# Patient Record
Sex: MALE | Race: WHITE | NOT HISPANIC OR LATINO | URBAN - METROPOLITAN AREA
[De-identification: names, ages, dates, MRNs, and addresses within clinical notes are randomized per-mention and may not be internally consistent; named-entity substitution may affect disease eponyms.]

---

## 2017-08-23 ENCOUNTER — OUTPATIENT (OUTPATIENT)
Dept: OUTPATIENT SERVICES | Facility: HOSPITAL | Age: 77
LOS: 1 days | End: 2017-08-23
Payer: MEDICARE

## 2017-08-23 ENCOUNTER — APPOINTMENT (OUTPATIENT)
Dept: RADIOLOGY | Facility: CLINIC | Age: 77
End: 2017-08-23
Payer: MEDICARE

## 2017-08-23 DIAGNOSIS — Z00.8 ENCOUNTER FOR OTHER GENERAL EXAMINATION: ICD-10-CM

## 2017-08-23 PROCEDURE — 73562 X-RAY EXAM OF KNEE 3: CPT

## 2017-08-23 PROCEDURE — 73562 X-RAY EXAM OF KNEE 3: CPT | Mod: 26,RT

## 2019-01-01 ENCOUNTER — TRANSCRIPTION ENCOUNTER (OUTPATIENT)
Age: 79
End: 2019-01-01

## 2019-01-01 ENCOUNTER — APPOINTMENT (OUTPATIENT)
Dept: SURGERY | Facility: CLINIC | Age: 79
End: 2019-01-01

## 2019-01-01 ENCOUNTER — INPATIENT (INPATIENT)
Facility: HOSPITAL | Age: 79
LOS: 2 days | Discharge: HOME | DRG: 394 | End: 2019-12-30
Attending: SURGERY | Admitting: SURGERY
Payer: COMMERCIAL

## 2019-01-01 VITALS
DIASTOLIC BLOOD PRESSURE: 69 MMHG | HEART RATE: 71 BPM | RESPIRATION RATE: 18 BRPM | TEMPERATURE: 99 F | SYSTOLIC BLOOD PRESSURE: 115 MMHG | OXYGEN SATURATION: 96 %

## 2019-01-01 VITALS
RESPIRATION RATE: 18 BRPM | DIASTOLIC BLOOD PRESSURE: 74 MMHG | HEART RATE: 74 BPM | OXYGEN SATURATION: 96 % | TEMPERATURE: 98 F | SYSTOLIC BLOOD PRESSURE: 129 MMHG

## 2019-01-01 VITALS — HEIGHT: 69 IN | WEIGHT: 229.94 LBS

## 2019-01-01 DIAGNOSIS — K56.609 UNSPECIFIED INTESTINAL OBSTRUCTION, UNSPECIFIED AS TO PARTIAL VERSUS COMPLETE OBSTRUCTION: ICD-10-CM

## 2019-01-01 LAB
ALBUMIN SERPL ELPH-MCNC: 3.9 G/DL — SIGNIFICANT CHANGE UP (ref 3.3–5)
ALP SERPL-CCNC: 177 U/L — HIGH (ref 40–120)
ALT FLD-CCNC: 23 U/L — SIGNIFICANT CHANGE UP (ref 10–45)
ANION GAP SERPL CALC-SCNC: 10 MMOL/L — SIGNIFICANT CHANGE UP (ref 5–17)
ANION GAP SERPL CALC-SCNC: 11 MMOL/L — SIGNIFICANT CHANGE UP (ref 5–17)
ANION GAP SERPL CALC-SCNC: 11 MMOL/L — SIGNIFICANT CHANGE UP (ref 5–17)
ANION GAP SERPL CALC-SCNC: 13 MMOL/L — SIGNIFICANT CHANGE UP (ref 5–17)
ANION GAP SERPL CALC-SCNC: 7 MMOL/L — SIGNIFICANT CHANGE UP (ref 5–17)
ANION GAP SERPL CALC-SCNC: 8 MMOL/L — SIGNIFICANT CHANGE UP (ref 5–17)
ANISOCYTOSIS BLD QL: SIGNIFICANT CHANGE UP
APTT BLD: 176 SEC — CRITICAL HIGH (ref 27.5–36.3)
AST SERPL-CCNC: 27 U/L — SIGNIFICANT CHANGE UP (ref 10–40)
BASE EXCESS BLDV CALC-SCNC: 1.1 MMOL/L — SIGNIFICANT CHANGE UP (ref -2–2)
BASE EXCESS BLDV CALC-SCNC: 3.1 MMOL/L — HIGH (ref -2–2)
BASOPHILS # BLD AUTO: 0 K/UL — SIGNIFICANT CHANGE UP (ref 0–0.2)
BASOPHILS NFR BLD AUTO: 0 % — SIGNIFICANT CHANGE UP (ref 0–2)
BILIRUB SERPL-MCNC: 0.4 MG/DL — SIGNIFICANT CHANGE UP (ref 0.2–1.2)
BLD GP AB SCN SERPL QL: NEGATIVE — SIGNIFICANT CHANGE UP
BUN SERPL-MCNC: 15 MG/DL — SIGNIFICANT CHANGE UP (ref 7–23)
BUN SERPL-MCNC: 16 MG/DL — SIGNIFICANT CHANGE UP (ref 7–23)
BUN SERPL-MCNC: 16 MG/DL — SIGNIFICANT CHANGE UP (ref 7–23)
BUN SERPL-MCNC: 22 MG/DL — SIGNIFICANT CHANGE UP (ref 7–23)
BUN SERPL-MCNC: 26 MG/DL — HIGH (ref 7–23)
BUN SERPL-MCNC: 32 MG/DL — HIGH (ref 7–23)
BURR CELLS BLD QL SMEAR: PRESENT — SIGNIFICANT CHANGE UP
CA-I SERPL-SCNC: 1.33 MMOL/L — HIGH (ref 1.12–1.3)
CA-I SERPL-SCNC: 1.34 MMOL/L — HIGH (ref 1.12–1.3)
CALCIUM SERPL-MCNC: 10 MG/DL — SIGNIFICANT CHANGE UP (ref 8.4–10.5)
CALCIUM SERPL-MCNC: 10.6 MG/DL — HIGH (ref 8.4–10.5)
CALCIUM SERPL-MCNC: 8.7 MG/DL — SIGNIFICANT CHANGE UP (ref 8.4–10.5)
CALCIUM SERPL-MCNC: 9 MG/DL — SIGNIFICANT CHANGE UP (ref 8.4–10.5)
CALCIUM SERPL-MCNC: 9 MG/DL — SIGNIFICANT CHANGE UP (ref 8.4–10.5)
CALCIUM SERPL-MCNC: 9.1 MG/DL — SIGNIFICANT CHANGE UP (ref 8.4–10.5)
CHLORIDE BLDV-SCNC: 107 MMOL/L — SIGNIFICANT CHANGE UP (ref 96–108)
CHLORIDE BLDV-SCNC: 109 MMOL/L — HIGH (ref 96–108)
CHLORIDE SERPL-SCNC: 103 MMOL/L — SIGNIFICANT CHANGE UP (ref 96–108)
CHLORIDE SERPL-SCNC: 103 MMOL/L — SIGNIFICANT CHANGE UP (ref 96–108)
CHLORIDE SERPL-SCNC: 106 MMOL/L — SIGNIFICANT CHANGE UP (ref 96–108)
CHLORIDE SERPL-SCNC: 107 MMOL/L — SIGNIFICANT CHANGE UP (ref 96–108)
CHLORIDE SERPL-SCNC: 108 MMOL/L — SIGNIFICANT CHANGE UP (ref 96–108)
CHLORIDE SERPL-SCNC: 109 MMOL/L — HIGH (ref 96–108)
CO2 BLDV-SCNC: 27 MMOL/L — SIGNIFICANT CHANGE UP (ref 22–30)
CO2 BLDV-SCNC: 29 MMOL/L — SIGNIFICANT CHANGE UP (ref 22–30)
CO2 SERPL-SCNC: 23 MMOL/L — SIGNIFICANT CHANGE UP (ref 22–31)
CO2 SERPL-SCNC: 24 MMOL/L — SIGNIFICANT CHANGE UP (ref 22–31)
CO2 SERPL-SCNC: 24 MMOL/L — SIGNIFICANT CHANGE UP (ref 22–31)
CO2 SERPL-SCNC: 25 MMOL/L — SIGNIFICANT CHANGE UP (ref 22–31)
CO2 SERPL-SCNC: 25 MMOL/L — SIGNIFICANT CHANGE UP (ref 22–31)
CO2 SERPL-SCNC: 26 MMOL/L — SIGNIFICANT CHANGE UP (ref 22–31)
CREAT SERPL-MCNC: 0.78 MG/DL — SIGNIFICANT CHANGE UP (ref 0.5–1.3)
CREAT SERPL-MCNC: 0.81 MG/DL — SIGNIFICANT CHANGE UP (ref 0.5–1.3)
CREAT SERPL-MCNC: 0.84 MG/DL — SIGNIFICANT CHANGE UP (ref 0.5–1.3)
CREAT SERPL-MCNC: 0.88 MG/DL — SIGNIFICANT CHANGE UP (ref 0.5–1.3)
CREAT SERPL-MCNC: 0.91 MG/DL — SIGNIFICANT CHANGE UP (ref 0.5–1.3)
CREAT SERPL-MCNC: 0.94 MG/DL — SIGNIFICANT CHANGE UP (ref 0.5–1.3)
CULTURE RESULTS: SIGNIFICANT CHANGE UP
DACRYOCYTES BLD QL SMEAR: SLIGHT — SIGNIFICANT CHANGE UP
ELLIPTOCYTES BLD QL SMEAR: SIGNIFICANT CHANGE UP
EOSINOPHIL # BLD AUTO: 0 K/UL — SIGNIFICANT CHANGE UP (ref 0–0.5)
EOSINOPHIL NFR BLD AUTO: 0 % — SIGNIFICANT CHANGE UP (ref 0–6)
GAS PNL BLDV: 142 MMOL/L — SIGNIFICANT CHANGE UP (ref 135–145)
GAS PNL BLDV: 143 MMOL/L — SIGNIFICANT CHANGE UP (ref 135–145)
GAS PNL BLDV: SIGNIFICANT CHANGE UP
GLUCOSE BLDV-MCNC: 127 MG/DL — HIGH (ref 70–99)
GLUCOSE BLDV-MCNC: 139 MG/DL — HIGH (ref 70–99)
GLUCOSE SERPL-MCNC: 118 MG/DL — HIGH (ref 70–99)
GLUCOSE SERPL-MCNC: 146 MG/DL — HIGH (ref 70–99)
GLUCOSE SERPL-MCNC: 81 MG/DL — SIGNIFICANT CHANGE UP (ref 70–99)
GLUCOSE SERPL-MCNC: 90 MG/DL — SIGNIFICANT CHANGE UP (ref 70–99)
GLUCOSE SERPL-MCNC: 95 MG/DL — SIGNIFICANT CHANGE UP (ref 70–99)
GLUCOSE SERPL-MCNC: 98 MG/DL — SIGNIFICANT CHANGE UP (ref 70–99)
HCO3 BLDV-SCNC: 26 MMOL/L — SIGNIFICANT CHANGE UP (ref 21–29)
HCO3 BLDV-SCNC: 28 MMOL/L — SIGNIFICANT CHANGE UP (ref 21–29)
HCT VFR BLD CALC: 25.4 % — LOW (ref 39–50)
HCT VFR BLD CALC: 26.2 % — LOW (ref 39–50)
HCT VFR BLD CALC: 29 % — LOW (ref 39–50)
HCT VFR BLD CALC: 29.1 % — LOW (ref 39–50)
HCT VFR BLD CALC: 29.4 % — LOW (ref 39–50)
HCT VFR BLD CALC: 29.9 % — LOW (ref 39–50)
HCT VFR BLD CALC: 31.7 % — LOW (ref 39–50)
HCT VFR BLD CALC: 35.6 % — LOW (ref 39–50)
HCT VFR BLD CALC: SIGNIFICANT CHANGE UP (ref 39–50)
HCT VFR BLDA CALC: 26 % — LOW (ref 39–50)
HCT VFR BLDA CALC: 30 % — LOW (ref 39–50)
HGB BLD CALC-MCNC: 8.5 G/DL — LOW (ref 13–17)
HGB BLD CALC-MCNC: 9.9 G/DL — LOW (ref 13–17)
HGB BLD-MCNC: 7 G/DL — CRITICAL LOW (ref 13–17)
HGB BLD-MCNC: 7.1 G/DL — LOW (ref 13–17)
HGB BLD-MCNC: 7.4 G/DL — LOW (ref 13–17)
HGB BLD-MCNC: 8 G/DL — LOW (ref 13–17)
HGB BLD-MCNC: 8 G/DL — LOW (ref 13–17)
HGB BLD-MCNC: 8.1 G/DL — LOW (ref 13–17)
HGB BLD-MCNC: 8.3 G/DL — LOW (ref 13–17)
HGB BLD-MCNC: 8.8 G/DL — LOW (ref 13–17)
HGB BLD-MCNC: 9.5 G/DL — LOW (ref 13–17)
HYPOCHROMIA BLD QL: SIGNIFICANT CHANGE UP
INR BLD: 1.25 RATIO — HIGH (ref 0.88–1.16)
LACTATE BLDV-MCNC: 1.6 MMOL/L — SIGNIFICANT CHANGE UP (ref 0.7–2)
LACTATE BLDV-MCNC: 2.4 MMOL/L — HIGH (ref 0.7–2)
LACTATE SERPL-SCNC: 1.4 MMOL/L — SIGNIFICANT CHANGE UP (ref 0.7–2)
LDH SERPL L TO P-CCNC: 134 U/L — SIGNIFICANT CHANGE UP (ref 50–242)
LYMPHOCYTES # BLD AUTO: 0.87 K/UL — LOW (ref 1–3.3)
LYMPHOCYTES # BLD AUTO: 5.2 % — LOW (ref 13–44)
MAGNESIUM SERPL-MCNC: 1.8 MG/DL — SIGNIFICANT CHANGE UP (ref 1.6–2.6)
MAGNESIUM SERPL-MCNC: 1.9 MG/DL — SIGNIFICANT CHANGE UP (ref 1.6–2.6)
MAGNESIUM SERPL-MCNC: 1.9 MG/DL — SIGNIFICANT CHANGE UP (ref 1.6–2.6)
MAGNESIUM SERPL-MCNC: 2.2 MG/DL — SIGNIFICANT CHANGE UP (ref 1.6–2.6)
MAGNESIUM SERPL-MCNC: 2.2 MG/DL — SIGNIFICANT CHANGE UP (ref 1.6–2.6)
MANUAL SMEAR VERIFICATION: SIGNIFICANT CHANGE UP
MCHC RBC-ENTMCNC: 19.4 PG — LOW (ref 27–34)
MCHC RBC-ENTMCNC: 19.5 PG — LOW (ref 27–34)
MCHC RBC-ENTMCNC: 19.5 PG — LOW (ref 27–34)
MCHC RBC-ENTMCNC: 19.7 PG — LOW (ref 27–34)
MCHC RBC-ENTMCNC: 19.8 PG — LOW (ref 27–34)
MCHC RBC-ENTMCNC: 21.2 PG — LOW (ref 27–34)
MCHC RBC-ENTMCNC: 26.7 GM/DL — LOW (ref 32–36)
MCHC RBC-ENTMCNC: 27.1 GM/DL — LOW (ref 32–36)
MCHC RBC-ENTMCNC: 27.1 GM/DL — LOW (ref 32–36)
MCHC RBC-ENTMCNC: 27.2 GM/DL — LOW (ref 32–36)
MCHC RBC-ENTMCNC: 27.6 GM/DL — LOW (ref 32–36)
MCHC RBC-ENTMCNC: 27.6 GM/DL — LOW (ref 32–36)
MCHC RBC-ENTMCNC: 27.8 GM/DL — LOW (ref 32–36)
MCHC RBC-ENTMCNC: 28.5 GM/DL — LOW (ref 32–36)
MCHC RBC-ENTMCNC: SIGNIFICANT CHANGE UP (ref 27–34)
MCHC RBC-ENTMCNC: SIGNIFICANT CHANGE UP (ref 32–36)
MCV RBC AUTO: 71.2 FL — LOW (ref 80–100)
MCV RBC AUTO: 71.8 FL — LOW (ref 80–100)
MCV RBC AUTO: 71.8 FL — LOW (ref 80–100)
MCV RBC AUTO: 71.9 FL — LOW (ref 80–100)
MCV RBC AUTO: 72 FL — LOW (ref 80–100)
MCV RBC AUTO: 72.2 FL — LOW (ref 80–100)
MCV RBC AUTO: 72.7 FL — LOW (ref 80–100)
MCV RBC AUTO: 74.4 FL — LOW (ref 80–100)
MCV RBC AUTO: SIGNIFICANT CHANGE UP (ref 80–100)
MICROCYTES BLD QL: SIGNIFICANT CHANGE UP
MONOCYTES # BLD AUTO: 0.87 K/UL — SIGNIFICANT CHANGE UP (ref 0–0.9)
MONOCYTES NFR BLD AUTO: 5.2 % — SIGNIFICANT CHANGE UP (ref 2–14)
MYELOCYTES NFR BLD: 0.9 % — HIGH (ref 0–0)
NEUTROPHILS # BLD AUTO: 14.79 K/UL — HIGH (ref 1.8–7.4)
NEUTROPHILS NFR BLD AUTO: 88.7 % — HIGH (ref 43–77)
NRBC # BLD: 0 /100 WBCS — SIGNIFICANT CHANGE UP (ref 0–0)
OTHER CELLS CSF MANUAL: 7 ML/DL — LOW (ref 18–22)
OVALOCYTES BLD QL SMEAR: SIGNIFICANT CHANGE UP
PCO2 BLDV: 44 MMHG — SIGNIFICANT CHANGE UP (ref 35–50)
PCO2 BLDV: 44 MMHG — SIGNIFICANT CHANGE UP (ref 35–50)
PH BLDV: 7.38 — SIGNIFICANT CHANGE UP (ref 7.35–7.45)
PH BLDV: 7.41 — SIGNIFICANT CHANGE UP (ref 7.35–7.45)
PHOSPHATE SERPL-MCNC: 1.9 MG/DL — LOW (ref 2.5–4.5)
PHOSPHATE SERPL-MCNC: 2.2 MG/DL — LOW (ref 2.5–4.5)
PHOSPHATE SERPL-MCNC: 2.3 MG/DL — LOW (ref 2.5–4.5)
PHOSPHATE SERPL-MCNC: 2.4 MG/DL — LOW (ref 2.5–4.5)
PHOSPHATE SERPL-MCNC: 3 MG/DL — SIGNIFICANT CHANGE UP (ref 2.5–4.5)
PLAT MORPH BLD: NORMAL — SIGNIFICANT CHANGE UP
PLATELET # BLD AUTO: 209 K/UL — SIGNIFICANT CHANGE UP (ref 150–400)
PLATELET # BLD AUTO: 218 K/UL — SIGNIFICANT CHANGE UP (ref 150–400)
PLATELET # BLD AUTO: 227 K/UL — SIGNIFICANT CHANGE UP (ref 150–400)
PLATELET # BLD AUTO: 229 K/UL — SIGNIFICANT CHANGE UP (ref 150–400)
PLATELET # BLD AUTO: 232 K/UL — SIGNIFICANT CHANGE UP (ref 150–400)
PLATELET # BLD AUTO: 233 K/UL — SIGNIFICANT CHANGE UP (ref 150–400)
PLATELET # BLD AUTO: 241 K/UL — SIGNIFICANT CHANGE UP (ref 150–400)
PLATELET # BLD AUTO: 262 K/UL — SIGNIFICANT CHANGE UP (ref 150–400)
PLATELET # BLD AUTO: 290 K/UL — SIGNIFICANT CHANGE UP (ref 150–400)
PO2 BLDV: 30 MMHG — SIGNIFICANT CHANGE UP (ref 25–45)
PO2 BLDV: 40 MMHG — SIGNIFICANT CHANGE UP (ref 25–45)
POIKILOCYTOSIS BLD QL AUTO: SIGNIFICANT CHANGE UP
POLYCHROMASIA BLD QL SMEAR: SIGNIFICANT CHANGE UP
POTASSIUM BLDV-SCNC: 4 MMOL/L — SIGNIFICANT CHANGE UP (ref 3.5–5.3)
POTASSIUM BLDV-SCNC: 4.7 MMOL/L — SIGNIFICANT CHANGE UP (ref 3.5–5.3)
POTASSIUM SERPL-MCNC: 3.6 MMOL/L — SIGNIFICANT CHANGE UP (ref 3.5–5.3)
POTASSIUM SERPL-MCNC: 3.6 MMOL/L — SIGNIFICANT CHANGE UP (ref 3.5–5.3)
POTASSIUM SERPL-MCNC: 3.8 MMOL/L — SIGNIFICANT CHANGE UP (ref 3.5–5.3)
POTASSIUM SERPL-MCNC: 3.8 MMOL/L — SIGNIFICANT CHANGE UP (ref 3.5–5.3)
POTASSIUM SERPL-MCNC: 3.9 MMOL/L — SIGNIFICANT CHANGE UP (ref 3.5–5.3)
POTASSIUM SERPL-MCNC: 4 MMOL/L — SIGNIFICANT CHANGE UP (ref 3.5–5.3)
POTASSIUM SERPL-SCNC: 3.6 MMOL/L — SIGNIFICANT CHANGE UP (ref 3.5–5.3)
POTASSIUM SERPL-SCNC: 3.6 MMOL/L — SIGNIFICANT CHANGE UP (ref 3.5–5.3)
POTASSIUM SERPL-SCNC: 3.8 MMOL/L — SIGNIFICANT CHANGE UP (ref 3.5–5.3)
POTASSIUM SERPL-SCNC: 3.8 MMOL/L — SIGNIFICANT CHANGE UP (ref 3.5–5.3)
POTASSIUM SERPL-SCNC: 3.9 MMOL/L — SIGNIFICANT CHANGE UP (ref 3.5–5.3)
POTASSIUM SERPL-SCNC: 4 MMOL/L — SIGNIFICANT CHANGE UP (ref 3.5–5.3)
PROT SERPL-MCNC: 7.7 G/DL — SIGNIFICANT CHANGE UP (ref 6–8.3)
PROTHROM AB SERPL-ACNC: 14.5 SEC — HIGH (ref 10–12.9)
RBC # BLD: 3.53 M/UL — LOW (ref 4.2–5.8)
RBC # BLD: 3.65 M/UL — LOW (ref 4.2–5.8)
RBC # BLD: 3.75 M/UL — LOW (ref 4.2–5.8)
RBC # BLD: 3.91 M/UL — LOW (ref 4.2–5.8)
RBC # BLD: 4.04 M/UL — LOW (ref 4.2–5.8)
RBC # BLD: 4.07 M/UL — LOW (ref 4.2–5.8)
RBC # BLD: 4.16 M/UL — LOW (ref 4.2–5.8)
RBC # BLD: 4.45 M/UL — SIGNIFICANT CHANGE UP (ref 4.2–5.8)
RBC # BLD: 4.9 M/UL — SIGNIFICANT CHANGE UP (ref 4.2–5.8)
RBC # FLD: 17.6 % — HIGH (ref 10.3–14.5)
RBC # FLD: 18.3 % — HIGH (ref 10.3–14.5)
RBC # FLD: 18.4 % — HIGH (ref 10.3–14.5)
RBC # FLD: 18.5 % — HIGH (ref 10.3–14.5)
RBC # FLD: 18.5 % — HIGH (ref 10.3–14.5)
RBC # FLD: 18.6 % — HIGH (ref 10.3–14.5)
RBC # FLD: 18.6 % — HIGH (ref 10.3–14.5)
RBC # FLD: 18.7 % — HIGH (ref 10.3–14.5)
RBC # FLD: SIGNIFICANT CHANGE UP (ref 10.3–14.5)
RBC BLD AUTO: ABNORMAL
RH IG SCN BLD-IMP: POSITIVE — SIGNIFICANT CHANGE UP
SAO2 % BLDV: 50 % — LOW (ref 67–88)
SAO2 % BLDV: 72 % — SIGNIFICANT CHANGE UP (ref 67–88)
SCHISTOCYTES BLD QL AUTO: SLIGHT — SIGNIFICANT CHANGE UP
SODIUM SERPL-SCNC: 136 MMOL/L — SIGNIFICANT CHANGE UP (ref 135–145)
SODIUM SERPL-SCNC: 138 MMOL/L — SIGNIFICANT CHANGE UP (ref 135–145)
SODIUM SERPL-SCNC: 138 MMOL/L — SIGNIFICANT CHANGE UP (ref 135–145)
SODIUM SERPL-SCNC: 142 MMOL/L — SIGNIFICANT CHANGE UP (ref 135–145)
SODIUM SERPL-SCNC: 144 MMOL/L — SIGNIFICANT CHANGE UP (ref 135–145)
SODIUM SERPL-SCNC: 145 MMOL/L — SIGNIFICANT CHANGE UP (ref 135–145)
SPECIMEN SOURCE: SIGNIFICANT CHANGE UP
TARGETS BLD QL SMEAR: SLIGHT — SIGNIFICANT CHANGE UP
WBC # BLD: 11.06 K/UL — HIGH (ref 3.8–10.5)
WBC # BLD: 13.21 K/UL — HIGH (ref 3.8–10.5)
WBC # BLD: 16.67 K/UL — HIGH (ref 3.8–10.5)
WBC # BLD: 8.26 K/UL — SIGNIFICANT CHANGE UP (ref 3.8–10.5)
WBC # BLD: 8.37 K/UL — SIGNIFICANT CHANGE UP (ref 3.8–10.5)
WBC # BLD: 8.44 K/UL — SIGNIFICANT CHANGE UP (ref 3.8–10.5)
WBC # BLD: 8.86 K/UL — SIGNIFICANT CHANGE UP (ref 3.8–10.5)
WBC # BLD: 8.98 K/UL — SIGNIFICANT CHANGE UP (ref 3.8–10.5)
WBC # BLD: 9.88 K/UL — SIGNIFICANT CHANGE UP (ref 3.8–10.5)
WBC # FLD AUTO: 11.06 K/UL — HIGH (ref 3.8–10.5)
WBC # FLD AUTO: 13.21 K/UL — HIGH (ref 3.8–10.5)
WBC # FLD AUTO: 16.67 K/UL — HIGH (ref 3.8–10.5)
WBC # FLD AUTO: 8.26 K/UL — SIGNIFICANT CHANGE UP (ref 3.8–10.5)
WBC # FLD AUTO: 8.37 K/UL — SIGNIFICANT CHANGE UP (ref 3.8–10.5)
WBC # FLD AUTO: 8.44 K/UL — SIGNIFICANT CHANGE UP (ref 3.8–10.5)
WBC # FLD AUTO: 8.86 K/UL — SIGNIFICANT CHANGE UP (ref 3.8–10.5)
WBC # FLD AUTO: 8.98 K/UL — SIGNIFICANT CHANGE UP (ref 3.8–10.5)
WBC # FLD AUTO: 9.88 K/UL — SIGNIFICANT CHANGE UP (ref 3.8–10.5)

## 2019-01-01 PROCEDURE — 74177 CT ABD & PELVIS W/CONTRAST: CPT

## 2019-01-01 PROCEDURE — 86850 RBC ANTIBODY SCREEN: CPT

## 2019-01-01 PROCEDURE — 99231 SBSQ HOSP IP/OBS SF/LOW 25: CPT

## 2019-01-01 PROCEDURE — 85610 PROTHROMBIN TIME: CPT

## 2019-01-01 PROCEDURE — 82565 ASSAY OF CREATININE: CPT

## 2019-01-01 PROCEDURE — 83735 ASSAY OF MAGNESIUM: CPT

## 2019-01-01 PROCEDURE — 82803 BLOOD GASES ANY COMBINATION: CPT

## 2019-01-01 PROCEDURE — 84295 ASSAY OF SERUM SODIUM: CPT

## 2019-01-01 PROCEDURE — 96376 TX/PRO/DX INJ SAME DRUG ADON: CPT | Mod: XU

## 2019-01-01 PROCEDURE — 83605 ASSAY OF LACTIC ACID: CPT

## 2019-01-01 PROCEDURE — 99285 EMERGENCY DEPT VISIT HI MDM: CPT | Mod: 25

## 2019-01-01 PROCEDURE — 82947 ASSAY GLUCOSE BLOOD QUANT: CPT

## 2019-01-01 PROCEDURE — 82330 ASSAY OF CALCIUM: CPT

## 2019-01-01 PROCEDURE — 99238 HOSP IP/OBS DSCHRG MGMT 30/<: CPT

## 2019-01-01 PROCEDURE — 84132 ASSAY OF SERUM POTASSIUM: CPT

## 2019-01-01 PROCEDURE — 82435 ASSAY OF BLOOD CHLORIDE: CPT

## 2019-01-01 PROCEDURE — 86900 BLOOD TYPING SEROLOGIC ABO: CPT

## 2019-01-01 PROCEDURE — 97162 PT EVAL MOD COMPLEX 30 MIN: CPT

## 2019-01-01 PROCEDURE — 85027 COMPLETE CBC AUTOMATED: CPT

## 2019-01-01 PROCEDURE — 96374 THER/PROPH/DIAG INJ IV PUSH: CPT | Mod: XU

## 2019-01-01 PROCEDURE — 97161 PT EVAL LOW COMPLEX 20 MIN: CPT

## 2019-01-01 PROCEDURE — 71045 X-RAY EXAM CHEST 1 VIEW: CPT | Mod: 26

## 2019-01-01 PROCEDURE — 85014 HEMATOCRIT: CPT

## 2019-01-01 PROCEDURE — 83615 LACTATE (LD) (LDH) ENZYME: CPT

## 2019-01-01 PROCEDURE — 85730 THROMBOPLASTIN TIME PARTIAL: CPT

## 2019-01-01 PROCEDURE — 43753 TX GASTRO INTUB W/ASP: CPT

## 2019-01-01 PROCEDURE — 86901 BLOOD TYPING SEROLOGIC RH(D): CPT

## 2019-01-01 PROCEDURE — 71045 X-RAY EXAM CHEST 1 VIEW: CPT

## 2019-01-01 PROCEDURE — 74176 CT ABD & PELVIS W/O CONTRAST: CPT

## 2019-01-01 PROCEDURE — 93005 ELECTROCARDIOGRAM TRACING: CPT | Mod: XU

## 2019-01-01 PROCEDURE — 84100 ASSAY OF PHOSPHORUS: CPT

## 2019-01-01 PROCEDURE — 80048 BASIC METABOLIC PNL TOTAL CA: CPT

## 2019-01-01 PROCEDURE — 87086 URINE CULTURE/COLONY COUNT: CPT

## 2019-01-01 PROCEDURE — 74177 CT ABD & PELVIS W/CONTRAST: CPT | Mod: 26

## 2019-01-01 PROCEDURE — 99222 1ST HOSP IP/OBS MODERATE 55: CPT | Mod: GC

## 2019-01-01 PROCEDURE — 76705 ECHO EXAM OF ABDOMEN: CPT

## 2019-01-01 PROCEDURE — 80053 COMPREHEN METABOLIC PANEL: CPT

## 2019-01-01 PROCEDURE — 81001 URINALYSIS AUTO W/SCOPE: CPT

## 2019-01-01 PROCEDURE — 83690 ASSAY OF LIPASE: CPT

## 2019-01-01 PROCEDURE — 99233 SBSQ HOSP IP/OBS HIGH 50: CPT

## 2019-01-01 RX ORDER — FINASTERIDE 5 MG/1
5 TABLET, FILM COATED ORAL DAILY
Refills: 0 | Status: DISCONTINUED | OUTPATIENT
Start: 2019-01-01 | End: 2019-01-01

## 2019-01-01 RX ORDER — POTASSIUM PHOSPHATE, MONOBASIC POTASSIUM PHOSPHATE, DIBASIC 236; 224 MG/ML; MG/ML
30 INJECTION, SOLUTION INTRAVENOUS ONCE
Refills: 0 | Status: COMPLETED | OUTPATIENT
Start: 2019-01-01 | End: 2019-01-01

## 2019-01-01 RX ORDER — POTASSIUM CHLORIDE 20 MEQ
10 PACKET (EA) ORAL
Refills: 0 | Status: COMPLETED | OUTPATIENT
Start: 2019-01-01 | End: 2019-01-01

## 2019-01-01 RX ORDER — SODIUM,POTASSIUM PHOSPHATES 278-250MG
1 POWDER IN PACKET (EA) ORAL ONCE
Refills: 0 | Status: COMPLETED | OUTPATIENT
Start: 2019-01-01 | End: 2019-01-01

## 2019-01-01 RX ORDER — PANTOPRAZOLE SODIUM 20 MG/1
40 TABLET, DELAYED RELEASE ORAL DAILY
Refills: 0 | Status: DISCONTINUED | OUTPATIENT
Start: 2019-01-01 | End: 2019-01-01

## 2019-01-01 RX ORDER — MAGNESIUM SULFATE 500 MG/ML
2 VIAL (ML) INJECTION ONCE
Refills: 0 | Status: COMPLETED | OUTPATIENT
Start: 2019-01-01 | End: 2019-01-01

## 2019-01-01 RX ORDER — BUPROPION HYDROCHLORIDE 150 MG/1
100 TABLET, EXTENDED RELEASE ORAL DAILY
Refills: 0 | Status: DISCONTINUED | OUTPATIENT
Start: 2019-01-01 | End: 2019-01-01

## 2019-01-01 RX ORDER — INFLUENZA VIRUS VACCINE 15; 15; 15; 15 UG/.5ML; UG/.5ML; UG/.5ML; UG/.5ML
0.5 SUSPENSION INTRAMUSCULAR ONCE
Refills: 0 | Status: DISCONTINUED | OUTPATIENT
Start: 2019-01-01 | End: 2019-01-01

## 2019-01-01 RX ORDER — BUPROPION HYDROCHLORIDE 150 MG/1
150 TABLET, EXTENDED RELEASE ORAL DAILY
Refills: 0 | Status: DISCONTINUED | OUTPATIENT
Start: 2019-01-01 | End: 2019-01-01

## 2019-01-01 RX ORDER — FUROSEMIDE 40 MG
40 TABLET ORAL DAILY
Refills: 0 | Status: DISCONTINUED | OUTPATIENT
Start: 2019-01-01 | End: 2019-01-01

## 2019-01-01 RX ORDER — SODIUM,POTASSIUM PHOSPHATES 278-250MG
1 POWDER IN PACKET (EA) ORAL
Refills: 0 | Status: DISCONTINUED | OUTPATIENT
Start: 2019-01-01 | End: 2019-01-01

## 2019-01-01 RX ORDER — POTASSIUM PHOSPHATE, MONOBASIC POTASSIUM PHOSPHATE, DIBASIC 236; 224 MG/ML; MG/ML
30 INJECTION, SOLUTION INTRAVENOUS ONCE
Refills: 0 | Status: DISCONTINUED | OUTPATIENT
Start: 2019-01-01 | End: 2019-01-01

## 2019-01-01 RX ORDER — RIVAROXABAN 15 MG-20MG
20 KIT ORAL
Refills: 0 | Status: DISCONTINUED | OUTPATIENT
Start: 2019-01-01 | End: 2019-01-01

## 2019-01-01 RX ORDER — TAMSULOSIN HYDROCHLORIDE 0.4 MG/1
0.4 CAPSULE ORAL DAILY
Refills: 0 | Status: DISCONTINUED | OUTPATIENT
Start: 2019-01-01 | End: 2019-01-01

## 2019-01-01 RX ORDER — LISINOPRIL 2.5 MG/1
5 TABLET ORAL DAILY
Refills: 0 | Status: DISCONTINUED | OUTPATIENT
Start: 2019-01-01 | End: 2019-01-01

## 2019-01-01 RX ORDER — MORPHINE SULFATE 50 MG/1
4 CAPSULE, EXTENDED RELEASE ORAL ONCE
Refills: 0 | Status: DISCONTINUED | OUTPATIENT
Start: 2019-01-01 | End: 2019-01-01

## 2019-01-01 RX ORDER — ENOXAPARIN SODIUM 100 MG/ML
40 INJECTION SUBCUTANEOUS DAILY
Refills: 0 | Status: DISCONTINUED | OUTPATIENT
Start: 2019-01-01 | End: 2019-01-01

## 2019-01-01 RX ORDER — SODIUM CHLORIDE 9 MG/ML
1000 INJECTION, SOLUTION INTRAVENOUS ONCE
Refills: 0 | Status: COMPLETED | OUTPATIENT
Start: 2019-01-01 | End: 2019-01-01

## 2019-01-01 RX ORDER — RIVAROXABAN 15 MG-20MG
1 KIT ORAL
Qty: 0 | Refills: 0 | DISCHARGE
Start: 2019-01-01

## 2019-01-01 RX ORDER — DEXTROSE MONOHYDRATE, SODIUM CHLORIDE, AND POTASSIUM CHLORIDE 50; .745; 4.5 G/1000ML; G/1000ML; G/1000ML
1000 INJECTION, SOLUTION INTRAVENOUS
Refills: 0 | Status: DISCONTINUED | OUTPATIENT
Start: 2019-01-01 | End: 2019-01-01

## 2019-01-01 RX ORDER — PANTOPRAZOLE SODIUM 20 MG/1
40 TABLET, DELAYED RELEASE ORAL
Refills: 0 | Status: DISCONTINUED | OUTPATIENT
Start: 2019-01-01 | End: 2019-01-01

## 2019-01-01 RX ORDER — ACETAMINOPHEN 500 MG
1000 TABLET ORAL ONCE
Refills: 0 | Status: COMPLETED | OUTPATIENT
Start: 2019-01-01 | End: 2019-01-01

## 2019-01-01 RX ORDER — LIDOCAINE HCL 20 MG/ML
10 VIAL (ML) INJECTION ONCE
Refills: 0 | Status: COMPLETED | OUTPATIENT
Start: 2019-01-01 | End: 2019-01-01

## 2019-01-01 RX ORDER — ACETAMINOPHEN 500 MG
975 TABLET ORAL ONCE
Refills: 0 | Status: COMPLETED | OUTPATIENT
Start: 2019-01-01 | End: 2019-01-01

## 2019-01-01 RX ORDER — TETRACAINE/BENZOCAINE/BUTAMBEN 2%-14%-2%
1 OINTMENT (GRAM) TOPICAL ONCE
Refills: 0 | Status: COMPLETED | OUTPATIENT
Start: 2019-01-01 | End: 2019-01-01

## 2019-01-01 RX ORDER — SODIUM CHLORIDE 9 MG/ML
1000 INJECTION, SOLUTION INTRAVENOUS
Refills: 0 | Status: DISCONTINUED | OUTPATIENT
Start: 2019-01-01 | End: 2019-01-01

## 2019-01-01 RX ADMIN — TAMSULOSIN HYDROCHLORIDE 0.4 MILLIGRAM(S): 0.4 CAPSULE ORAL at 21:43

## 2019-01-01 RX ADMIN — PANTOPRAZOLE SODIUM 40 MILLIGRAM(S): 20 TABLET, DELAYED RELEASE ORAL at 08:49

## 2019-01-01 RX ADMIN — MORPHINE SULFATE 4 MILLIGRAM(S): 50 CAPSULE, EXTENDED RELEASE ORAL at 05:01

## 2019-01-01 RX ADMIN — Medication 1 PACKET(S): at 10:51

## 2019-01-01 RX ADMIN — POTASSIUM PHOSPHATE, MONOBASIC POTASSIUM PHOSPHATE, DIBASIC 83.33 MILLIMOLE(S): 236; 224 INJECTION, SOLUTION INTRAVENOUS at 08:35

## 2019-01-01 RX ADMIN — FINASTERIDE 5 MILLIGRAM(S): 5 TABLET, FILM COATED ORAL at 12:06

## 2019-01-01 RX ADMIN — BUPROPION HYDROCHLORIDE 100 MILLIGRAM(S): 150 TABLET, EXTENDED RELEASE ORAL at 12:59

## 2019-01-01 RX ADMIN — BUPROPION HYDROCHLORIDE 100 MILLIGRAM(S): 150 TABLET, EXTENDED RELEASE ORAL at 12:36

## 2019-01-01 RX ADMIN — RIVAROXABAN 20 MILLIGRAM(S): KIT at 16:55

## 2019-01-01 RX ADMIN — Medication 1 TABLET(S): at 13:02

## 2019-01-01 RX ADMIN — Medication 50 GRAM(S): at 14:42

## 2019-01-01 RX ADMIN — Medication 975 MILLIGRAM(S): at 12:07

## 2019-01-01 RX ADMIN — Medication 85 MILLIMOLE(S): at 08:32

## 2019-01-01 RX ADMIN — SODIUM CHLORIDE 1000 MILLILITER(S): 9 INJECTION, SOLUTION INTRAVENOUS at 05:49

## 2019-01-01 RX ADMIN — RIVAROXABAN 20 MILLIGRAM(S): KIT at 17:32

## 2019-01-01 RX ADMIN — Medication 1000 MILLIGRAM(S): at 02:42

## 2019-01-01 RX ADMIN — FINASTERIDE 5 MILLIGRAM(S): 5 TABLET, FILM COATED ORAL at 12:59

## 2019-01-01 RX ADMIN — Medication 1 SPRAY(S): at 07:28

## 2019-01-01 RX ADMIN — PANTOPRAZOLE SODIUM 40 MILLIGRAM(S): 20 TABLET, DELAYED RELEASE ORAL at 17:27

## 2019-01-01 RX ADMIN — LISINOPRIL 5 MILLIGRAM(S): 2.5 TABLET ORAL at 06:21

## 2019-01-01 RX ADMIN — Medication 10 MILLILITER(S): at 07:28

## 2019-01-01 RX ADMIN — LISINOPRIL 5 MILLIGRAM(S): 2.5 TABLET ORAL at 06:33

## 2019-01-01 RX ADMIN — Medication 400 MILLIGRAM(S): at 02:07

## 2019-01-01 RX ADMIN — Medication 40 MILLIGRAM(S): at 08:49

## 2019-01-01 RX ADMIN — FINASTERIDE 5 MILLIGRAM(S): 5 TABLET, FILM COATED ORAL at 11:47

## 2019-01-01 RX ADMIN — Medication 1 APPLICATION(S): at 23:43

## 2019-01-01 RX ADMIN — TAMSULOSIN HYDROCHLORIDE 0.4 MILLIGRAM(S): 0.4 CAPSULE ORAL at 12:04

## 2019-01-01 RX ADMIN — BUPROPION HYDROCHLORIDE 150 MILLIGRAM(S): 150 TABLET, EXTENDED RELEASE ORAL at 11:47

## 2019-01-01 RX ADMIN — MORPHINE SULFATE 4 MILLIGRAM(S): 50 CAPSULE, EXTENDED RELEASE ORAL at 06:56

## 2019-01-01 RX ADMIN — FINASTERIDE 5 MILLIGRAM(S): 5 TABLET, FILM COATED ORAL at 12:36

## 2019-01-01 RX ADMIN — PANTOPRAZOLE SODIUM 40 MILLIGRAM(S): 20 TABLET, DELAYED RELEASE ORAL at 12:06

## 2019-01-01 RX ADMIN — ENOXAPARIN SODIUM 40 MILLIGRAM(S): 100 INJECTION SUBCUTANEOUS at 12:03

## 2019-01-01 RX ADMIN — Medication 1 PACKET(S): at 08:32

## 2019-01-01 RX ADMIN — PANTOPRAZOLE SODIUM 40 MILLIGRAM(S): 20 TABLET, DELAYED RELEASE ORAL at 11:56

## 2019-01-01 RX ADMIN — MORPHINE SULFATE 4 MILLIGRAM(S): 50 CAPSULE, EXTENDED RELEASE ORAL at 05:50

## 2019-01-01 RX ADMIN — TAMSULOSIN HYDROCHLORIDE 0.4 MILLIGRAM(S): 0.4 CAPSULE ORAL at 22:03

## 2019-01-01 RX ADMIN — PANTOPRAZOLE SODIUM 40 MILLIGRAM(S): 20 TABLET, DELAYED RELEASE ORAL at 17:46

## 2019-01-01 RX ADMIN — PANTOPRAZOLE SODIUM 40 MILLIGRAM(S): 20 TABLET, DELAYED RELEASE ORAL at 05:13

## 2019-01-01 RX ADMIN — BUPROPION HYDROCHLORIDE 100 MILLIGRAM(S): 150 TABLET, EXTENDED RELEASE ORAL at 12:06

## 2019-01-01 RX ADMIN — Medication 100 MILLIEQUIVALENT(S): at 16:47

## 2019-01-01 RX ADMIN — DEXTROSE MONOHYDRATE, SODIUM CHLORIDE, AND POTASSIUM CHLORIDE 120 MILLILITER(S): 50; .745; 4.5 INJECTION, SOLUTION INTRAVENOUS at 08:36

## 2019-01-01 RX ADMIN — RIVAROXABAN 20 MILLIGRAM(S): KIT at 17:28

## 2019-01-01 RX ADMIN — BUPROPION HYDROCHLORIDE 150 MILLIGRAM(S): 150 TABLET, EXTENDED RELEASE ORAL at 12:32

## 2019-01-01 RX ADMIN — LISINOPRIL 5 MILLIGRAM(S): 2.5 TABLET ORAL at 06:40

## 2019-01-01 RX ADMIN — PANTOPRAZOLE SODIUM 40 MILLIGRAM(S): 20 TABLET, DELAYED RELEASE ORAL at 12:36

## 2019-01-01 RX ADMIN — TAMSULOSIN HYDROCHLORIDE 0.4 MILLIGRAM(S): 0.4 CAPSULE ORAL at 11:47

## 2019-01-01 RX ADMIN — TAMSULOSIN HYDROCHLORIDE 0.4 MILLIGRAM(S): 0.4 CAPSULE ORAL at 12:32

## 2019-01-01 RX ADMIN — LISINOPRIL 5 MILLIGRAM(S): 2.5 TABLET ORAL at 08:49

## 2019-01-01 RX ADMIN — Medication 100 MILLIEQUIVALENT(S): at 11:55

## 2019-01-01 RX ADMIN — Medication 975 MILLIGRAM(S): at 12:37

## 2019-01-01 RX ADMIN — Medication 100 MILLIEQUIVALENT(S): at 08:35

## 2019-01-01 RX ADMIN — FINASTERIDE 5 MILLIGRAM(S): 5 TABLET, FILM COATED ORAL at 12:32

## 2019-04-04 ENCOUNTER — TRANSCRIPTION ENCOUNTER (OUTPATIENT)
Age: 79
End: 2019-04-04

## 2019-04-04 ENCOUNTER — APPOINTMENT (OUTPATIENT)
Dept: SURGERY | Facility: CLINIC | Age: 79
End: 2019-04-04
Payer: COMMERCIAL

## 2019-04-04 VITALS
RESPIRATION RATE: 16 BRPM | BODY MASS INDEX: 31.1 KG/M2 | HEART RATE: 94 BPM | HEIGHT: 69 IN | SYSTOLIC BLOOD PRESSURE: 124 MMHG | WEIGHT: 210 LBS | DIASTOLIC BLOOD PRESSURE: 80 MMHG | OXYGEN SATURATION: 97 % | TEMPERATURE: 97.7 F

## 2019-04-04 DIAGNOSIS — Z85.828 PERSONAL HISTORY OF OTHER MALIGNANT NEOPLASM OF SKIN: ICD-10-CM

## 2019-04-04 DIAGNOSIS — Z80.0 FAMILY HISTORY OF MALIGNANT NEOPLASM OF DIGESTIVE ORGANS: ICD-10-CM

## 2019-04-04 DIAGNOSIS — Z87.19 PERSONAL HISTORY OF OTHER DISEASES OF THE DIGESTIVE SYSTEM: ICD-10-CM

## 2019-04-04 DIAGNOSIS — Z80.3 FAMILY HISTORY OF MALIGNANT NEOPLASM OF BREAST: ICD-10-CM

## 2019-04-04 DIAGNOSIS — Z82.49 FAMILY HISTORY OF ISCHEMIC HEART DISEASE AND OTHER DISEASES OF THE CIRCULATORY SYSTEM: ICD-10-CM

## 2019-04-04 DIAGNOSIS — Z87.39 PERSONAL HISTORY OF OTHER DISEASES OF THE MUSCULOSKELETAL SYSTEM AND CONNECTIVE TISSUE: ICD-10-CM

## 2019-04-04 DIAGNOSIS — Z86.19 PERSONAL HISTORY OF OTHER INFECTIOUS AND PARASITIC DISEASES: ICD-10-CM

## 2019-04-04 PROCEDURE — 99203 OFFICE O/P NEW LOW 30 MIN: CPT

## 2019-04-04 PROCEDURE — 46500 INJECTION INTO HEMORRHOID(S): CPT

## 2019-04-04 NOTE — REVIEW OF SYSTEMS
[As Noted in HPI] : as noted in HPI [Negative] : Endocrine [Feeling Tired] : feeling tired [SOB on Exertion] : shortness of breath during exertion [Diarrhea] : diarrhea [Easy Bleeding] : a tendency for easy bleeding [Easy Bruising] : a tendency for easy bruising

## 2019-04-04 NOTE — ASSESSMENT
[FreeTextEntry1] : I have seen and evaluated patient and I have corroborated all nursing input into this note. Rectal bleeding most likely hemorrhoidal in origin. Since the patient is on Xarelto I recommended sclerotherapy. Indications, risks, benefits, alternatives reviewed including but not limited to bleeding, infection, and failure. The patient's son was present for the conversation and all questions were answered. The patient agreed.  1 cc of 5% phenol and cotton seed oil was injected into the 3 primary hemorrhoid. The patient will notify my office immediately if he has heavy bleeding, increasing pain, fevers, or difficulty urinating. Finally, the patient asked about being sure that he did not have a malignancy. I informed the patient and the son that the only way to rule it out would be either a virtual or endoscopic colonoscopy. I reviewed the indications, risks, benefits, alternatives for colonoscopy including but not limited to bleeding, perforation, and incomplete exam. The patient and son stated that they will make a decision regarding colonoscopy and will contact my office. The patient will follow up p.r.n. if he has persistent bleeding.

## 2019-04-04 NOTE — HISTORY OF PRESENT ILLNESS
[FreeTextEntry1] : Leif is a 78 y/o male here for evaluation of rectal bleeding. Reports intermittent rectal bleeding for the past few years. Patient moves his bowels daily. Has occasional diarrhea. Denies rectal pain. PMH of DVT/PE on Xarelto. Has IVC filter placed. PMH of multiple GI bleeds. His mother had colon cancer at age 85. Last colonoscopy was a few years ago as per patient.

## 2019-04-04 NOTE — PHYSICAL EXAM
[Normal Breath Sounds] : Normal breath sounds [Normal Heart Sounds] : normal heart sounds [Normal Rate and Rhythm] : normal rate and rhythm [No Rash or Lesion] : No rash or lesion [Alert] : alert [Oriented to Person] : oriented to person [Oriented to Place] : oriented to place [Oriented to Time] : oriented to time [Calm] : calm [Abdomen Masses] : No abdominal masses [Abdomen Tenderness] : ~T No ~M abdominal tenderness [JVD] : no jugular venous distention  [de-identified] : soft, nondistended. +BS [de-identified] : Well nourished male, in no apparent distress [de-identified] : WNL [de-identified] : Full ROM, Kyphosis [FreeTextEntry1] : Perianal inspection demonstrated prolapse of the internal hemorrhoids. The left lateral was the largest and was reducible. Digital exam and anoscopy confirmed large internal hemorrhoids. Rigid sigmoidoscopy to 10 cm where solid brown stool was encountered was unremarkable.

## 2019-11-10 ENCOUNTER — INPATIENT (INPATIENT)
Facility: HOSPITAL | Age: 79
LOS: 3 days | Discharge: HOME | DRG: 389 | End: 2019-11-14
Attending: SURGERY | Admitting: SURGERY
Payer: COMMERCIAL

## 2019-11-10 VITALS
WEIGHT: 223.11 LBS | SYSTOLIC BLOOD PRESSURE: 125 MMHG | HEIGHT: 65 IN | OXYGEN SATURATION: 97 % | HEART RATE: 88 BPM | DIASTOLIC BLOOD PRESSURE: 90 MMHG | RESPIRATION RATE: 18 BRPM | TEMPERATURE: 98 F

## 2019-11-10 LAB
ALBUMIN SERPL ELPH-MCNC: 3.9 G/DL — SIGNIFICANT CHANGE UP (ref 3.3–5)
ALP SERPL-CCNC: 160 U/L — HIGH (ref 40–120)
ALT FLD-CCNC: 26 U/L — SIGNIFICANT CHANGE UP (ref 10–45)
ANION GAP SERPL CALC-SCNC: 15 MMOL/L — SIGNIFICANT CHANGE UP (ref 5–17)
APTT BLD: >200 SEC — CRITICAL HIGH (ref 27.5–36.3)
AST SERPL-CCNC: 37 U/L — SIGNIFICANT CHANGE UP (ref 10–40)
BASE EXCESS BLDV CALC-SCNC: 3.1 MMOL/L — HIGH (ref -2–2)
BASE EXCESS BLDV CALC-SCNC: 3.9 MMOL/L — HIGH (ref -2–2)
BASOPHILS # BLD AUTO: 0.02 K/UL — SIGNIFICANT CHANGE UP (ref 0–0.2)
BASOPHILS NFR BLD AUTO: 0.1 % — SIGNIFICANT CHANGE UP (ref 0–2)
BILIRUB SERPL-MCNC: 0.4 MG/DL — SIGNIFICANT CHANGE UP (ref 0.2–1.2)
BLD GP AB SCN SERPL QL: NEGATIVE — SIGNIFICANT CHANGE UP
BUN SERPL-MCNC: 27 MG/DL — HIGH (ref 7–23)
CA-I SERPL-SCNC: 1.31 MMOL/L — HIGH (ref 1.12–1.3)
CA-I SERPL-SCNC: 1.35 MMOL/L — HIGH (ref 1.12–1.3)
CALCIUM SERPL-MCNC: 10.6 MG/DL — HIGH (ref 8.4–10.5)
CHLORIDE BLDV-SCNC: 103 MMOL/L — SIGNIFICANT CHANGE UP (ref 96–108)
CHLORIDE BLDV-SCNC: 104 MMOL/L — SIGNIFICANT CHANGE UP (ref 96–108)
CHLORIDE SERPL-SCNC: 102 MMOL/L — SIGNIFICANT CHANGE UP (ref 96–108)
CO2 BLDV-SCNC: 30 MMOL/L — SIGNIFICANT CHANGE UP (ref 22–30)
CO2 BLDV-SCNC: 30 MMOL/L — SIGNIFICANT CHANGE UP (ref 22–30)
CO2 SERPL-SCNC: 25 MMOL/L — SIGNIFICANT CHANGE UP (ref 22–31)
CREAT SERPL-MCNC: 0.93 MG/DL — SIGNIFICANT CHANGE UP (ref 0.5–1.3)
EOSINOPHIL # BLD AUTO: 0 K/UL — SIGNIFICANT CHANGE UP (ref 0–0.5)
EOSINOPHIL NFR BLD AUTO: 0 % — SIGNIFICANT CHANGE UP (ref 0–6)
GAS PNL BLDV: 140 MMOL/L — SIGNIFICANT CHANGE UP (ref 135–145)
GAS PNL BLDV: 142 MMOL/L — SIGNIFICANT CHANGE UP (ref 135–145)
GAS PNL BLDV: SIGNIFICANT CHANGE UP
GLUCOSE BLDV-MCNC: 125 MG/DL — HIGH (ref 70–99)
GLUCOSE BLDV-MCNC: 135 MG/DL — HIGH (ref 70–99)
GLUCOSE SERPL-MCNC: 151 MG/DL — HIGH (ref 70–99)
HCO3 BLDV-SCNC: 28 MMOL/L — SIGNIFICANT CHANGE UP (ref 21–29)
HCO3 BLDV-SCNC: 28 MMOL/L — SIGNIFICANT CHANGE UP (ref 21–29)
HCT VFR BLD CALC: 37.5 % — LOW (ref 39–50)
HCT VFR BLDA CALC: 31 % — LOW (ref 39–50)
HCT VFR BLDA CALC: 36 % — LOW (ref 39–50)
HGB BLD CALC-MCNC: 10.2 G/DL — LOW (ref 13–17)
HGB BLD CALC-MCNC: 11.6 G/DL — LOW (ref 13–17)
HGB BLD-MCNC: 11.2 G/DL — LOW (ref 13–17)
IMM GRANULOCYTES NFR BLD AUTO: 0.6 % — SIGNIFICANT CHANGE UP (ref 0–1.5)
INR BLD: 1.3 RATIO — HIGH (ref 0.88–1.16)
LACTATE BLDV-MCNC: 1.7 MMOL/L — SIGNIFICANT CHANGE UP (ref 0.7–2)
LACTATE BLDV-MCNC: 2.4 MMOL/L — HIGH (ref 0.7–2)
LACTATE SERPL-SCNC: 2.1 MMOL/L — HIGH (ref 0.7–2)
LIDOCAIN IGE QN: 16 U/L — SIGNIFICANT CHANGE UP (ref 7–60)
LYMPHOCYTES # BLD AUTO: 0.78 K/UL — LOW (ref 1–3.3)
LYMPHOCYTES # BLD AUTO: 5 % — LOW (ref 13–44)
MCHC RBC-ENTMCNC: 22.2 PG — LOW (ref 27–34)
MCHC RBC-ENTMCNC: 29.9 GM/DL — LOW (ref 32–36)
MCV RBC AUTO: 74.4 FL — LOW (ref 80–100)
MONOCYTES # BLD AUTO: 0.7 K/UL — SIGNIFICANT CHANGE UP (ref 0–0.9)
MONOCYTES NFR BLD AUTO: 4.5 % — SIGNIFICANT CHANGE UP (ref 2–14)
NEUTROPHILS # BLD AUTO: 13.94 K/UL — HIGH (ref 1.8–7.4)
NEUTROPHILS NFR BLD AUTO: 89.8 % — HIGH (ref 43–77)
NRBC # BLD: 0 /100 WBCS — SIGNIFICANT CHANGE UP (ref 0–0)
PCO2 BLDV: 46 MMHG — SIGNIFICANT CHANGE UP (ref 35–50)
PCO2 BLDV: 47 MMHG — SIGNIFICANT CHANGE UP (ref 35–50)
PH BLDV: 7.4 — SIGNIFICANT CHANGE UP (ref 7.35–7.45)
PH BLDV: 7.41 — SIGNIFICANT CHANGE UP (ref 7.35–7.45)
PLATELET # BLD AUTO: 301 K/UL — SIGNIFICANT CHANGE UP (ref 150–400)
PO2 BLDV: 31 MMHG — SIGNIFICANT CHANGE UP (ref 25–45)
PO2 BLDV: <20 MMHG — LOW (ref 25–45)
POTASSIUM BLDV-SCNC: 3.6 MMOL/L — SIGNIFICANT CHANGE UP (ref 3.5–5.3)
POTASSIUM BLDV-SCNC: 3.7 MMOL/L — SIGNIFICANT CHANGE UP (ref 3.5–5.3)
POTASSIUM SERPL-MCNC: 4.2 MMOL/L — SIGNIFICANT CHANGE UP (ref 3.5–5.3)
POTASSIUM SERPL-SCNC: 4.2 MMOL/L — SIGNIFICANT CHANGE UP (ref 3.5–5.3)
PROT SERPL-MCNC: 7.4 G/DL — SIGNIFICANT CHANGE UP (ref 6–8.3)
PROTHROM AB SERPL-ACNC: 15.1 SEC — HIGH (ref 10–12.9)
RBC # BLD: 5.04 M/UL — SIGNIFICANT CHANGE UP (ref 4.2–5.8)
RBC # FLD: 18.6 % — HIGH (ref 10.3–14.5)
RH IG SCN BLD-IMP: POSITIVE — SIGNIFICANT CHANGE UP
SAO2 % BLDV: 25 % — LOW (ref 67–88)
SAO2 % BLDV: 52 % — LOW (ref 67–88)
SODIUM SERPL-SCNC: 142 MMOL/L — SIGNIFICANT CHANGE UP (ref 135–145)
WBC # BLD: 15.53 K/UL — HIGH (ref 3.8–10.5)
WBC # FLD AUTO: 15.53 K/UL — HIGH (ref 3.8–10.5)

## 2019-11-10 PROCEDURE — 74177 CT ABD & PELVIS W/CONTRAST: CPT | Mod: 26

## 2019-11-10 PROCEDURE — 99291 CRITICAL CARE FIRST HOUR: CPT

## 2019-11-10 PROCEDURE — 71045 X-RAY EXAM CHEST 1 VIEW: CPT | Mod: 26,76

## 2019-11-10 PROCEDURE — 76705 ECHO EXAM OF ABDOMEN: CPT | Mod: 26

## 2019-11-10 RX ORDER — SODIUM CHLORIDE 9 MG/ML
1000 INJECTION, SOLUTION INTRAVENOUS ONCE
Refills: 0 | Status: COMPLETED | OUTPATIENT
Start: 2019-11-10 | End: 2019-11-10

## 2019-11-10 RX ADMIN — SODIUM CHLORIDE 1000 MILLILITER(S): 9 INJECTION, SOLUTION INTRAVENOUS at 21:21

## 2019-11-10 NOTE — ED ADULT NURSE NOTE - OBJECTIVE STATEMENT
78 y/o male with PMH depression, DVT , PE on eloquis, factor xii deficiency, HTN BIBEMS presenting to ED for constant sharp RLQ abdominal pain x today without relief. Per pt: "It feels like gas but I've been taking gas pills and it hasn't helped." Upon exam pt A&Ox3 gross neuro intact, lungs cta bilaterally, no difficulty speaking in complete sentences, s1s2 heart sounds heard, pulses x 4, solis x4, abdomen soft distended, tender to palpation in all 4 quadrants, skin intact. Pt denies chest pain, sob, ha, n/v/d,  f/c, urinary symptoms, hematuria. LBM yesterday.

## 2019-11-10 NOTE — ED PROVIDER NOTE - PSH
Melanoma of Scalp,  Excision  Neoplasm, Left  shoulder  Excison  S/P Cholecystectomy  1970  Umbilical hernia  repair with mesh

## 2019-11-10 NOTE — ED PROVIDER NOTE - PHYSICAL EXAMINATION
A&Ox3, NAD. NCAT. PERRL, EOMI. Neck supple, no LAD. Lungs CTAB. +S1S2, RRR, No m/r/g. Abd distended, diffuse abdominal pain, +BS, no rebound or guarding. Extremities: cap refill <2, pulses in distal extremities 4+, no edema. Skin without rash. CN II-XII intact. Strength 5/5 UE/LE. Sensations intact throughout. A&Ox3, NAD. NCAT. PERRL, EOMI. Neck supple, no LAD. Lungs CTAB. +S1S2, RRR, No m/r/g. Abd distended, diffuse abdominal pain, +BS, no rebound or guarding. Extremities: cap refill <2, pulses in distal extremities 4+, no edema. Skin without rash. CN II-XII intact. Strength 5/5 UE/LE. Sensations intact throughout.  Attending Shauna Diggs: Gen: NAD, heent: atrauamtic, eomi, perrla, mmm, op pink, uvula midline, neck; nttp, no nuchal rigidity, chest: nttp, no crepitus, cv: rrr,, lungs: ctab, abd: soft, obese, palpable hernia, mild ttp diffusely , no peritoneal signs, +BS, no guarding, ext: wwp, neg homans, skin: no rash, neuro: awake and alert, following commands, speech clear, sensation and strength intact, no focal deficits A&Ox3, NAD. NCAT. PERRL, EOMI. Neck supple, no LAD. Lungs CTAB. +S1S2, RRR, No m/r/g. Abd distended, diffuse abdominal pain, +BS, no rebound or guarding. Extremities: cap refill <2, pulses in distal extremities 4+, no edema. Skin without rash. CN II-XII intact. Strength 5/5 UE/LE. Sensations intact throughout.  Attending Shauna Diggs: Gen: NAD, heent: atrauamtic, eomi, perrla, mmm, op pink, uvula midline, neck; nttp, no nuchal rigidity, chest: nttp, no crepitus, cv: rrr,, lungs: ctab, abd: soft, obese, distended palpable hernia, mild ttp diffusely , no peritoneal signs, +BS, no guarding, ext: wwp, neg homans, skin: no rash, neuro: awake and alert, following commands, speech clear, sensation and strength intact, no focal deficits

## 2019-11-10 NOTE — ED PROVIDER NOTE - CLINICAL SUMMARY MEDICAL DECISION MAKING FREE TEXT BOX
Attending Shauna Diggs: 78 y/o male h/o pe/dvt on xarelto with previous IVC filter, HTN, prior cholecystectomy and hernia repair presenting with diffuse abdominal pain. last BM yesterday, pt is passing gas but feels his abdomen more distended. upon arrival initially mildly tachycardic which resolved without intervetnion. afebrile. concern for possible early obstruction. no pulsatile mass on exam and BP stable. will obtain labs, CT abd/pelv and re-eval

## 2019-11-10 NOTE — ED PROVIDER NOTE - PROGRESS NOTE DETAILS
Attending Shauna Diggs: ct concerning for obstruction surgery consulted Attending Shauna Diggs: CT shows evidence of two sites of obstruction. unclear whether closed loop. surgery on way to evaluate. NGT placed with approximately 2L drained. pt and family member updated

## 2019-11-10 NOTE — ED PROVIDER NOTE - OBJECTIVE STATEMENT
78 yo male with pmh DVT/PE on xarelto, s/p IVC filter, cholecystectomy, HTN, HLD presenting with diffuse abdominal gas pain since 5 am this morning. Pt states he awoke with the pain, is unsure if pain woke him from his sleep but has been constant, nonradiating, not improved with gas-ex and came in as pain has not improved. Denies nausea/vomiting, pain unchanged with position or PO intake however pt notes decreased appetite today, has producing more flatus than usual and believes abdomen is more distended than usual. Last BM was yesterday and normal, nonbloody. no fevers, chills, back pain, chest pain, shortness of breath, diaphoresis, lightheadedness.

## 2019-11-10 NOTE — ED PROVIDER NOTE - PMH
BPH (Benign Prostatic Hyperplasia)    Depression    Diverticulitis of Colon with Bleeding    DVT (deep venous thrombosis)  s/p IVC Filter  Factor XII deficiency    Hemorrhoid    HTN (Hypertension)    Malignant Melanoma of Skin of Nose    Obesity    Obstructive Sleep Apnea  uses is CPAP prn  TREVER (obstructive sleep apnea)    PE (pulmonary embolism)

## 2019-11-10 NOTE — ED PROVIDER NOTE - ATTENDING CONTRIBUTION TO CARE
Attending MD Shauna Diggs:   I personally have seen and examined this patient.  Physician assistant note reviewed and agree on plan of care and except where noted.  See HPI, PE, and MDM for details.

## 2019-11-11 DIAGNOSIS — F32.9 MAJOR DEPRESSIVE DISORDER, SINGLE EPISODE, UNSPECIFIED: ICD-10-CM

## 2019-11-11 DIAGNOSIS — I10 ESSENTIAL (PRIMARY) HYPERTENSION: ICD-10-CM

## 2019-11-11 DIAGNOSIS — K56.609 UNSPECIFIED INTESTINAL OBSTRUCTION, UNSPECIFIED AS TO PARTIAL VERSUS COMPLETE OBSTRUCTION: ICD-10-CM

## 2019-11-11 DIAGNOSIS — Z29.9 ENCOUNTER FOR PROPHYLACTIC MEASURES, UNSPECIFIED: ICD-10-CM

## 2019-11-11 DIAGNOSIS — N40.0 BENIGN PROSTATIC HYPERPLASIA WITHOUT LOWER URINARY TRACT SYMPTOMS: ICD-10-CM

## 2019-11-11 DIAGNOSIS — I26.99 OTHER PULMONARY EMBOLISM WITHOUT ACUTE COR PULMONALE: ICD-10-CM

## 2019-11-11 LAB
ANION GAP SERPL CALC-SCNC: 11 MMOL/L — SIGNIFICANT CHANGE UP (ref 5–17)
APPEARANCE UR: CLEAR — SIGNIFICANT CHANGE UP
BILIRUB UR-MCNC: NEGATIVE — SIGNIFICANT CHANGE UP
BUN SERPL-MCNC: 24 MG/DL — HIGH (ref 7–23)
CALCIUM SERPL-MCNC: 9.5 MG/DL — SIGNIFICANT CHANGE UP (ref 8.4–10.5)
CHLORIDE SERPL-SCNC: 105 MMOL/L — SIGNIFICANT CHANGE UP (ref 96–108)
CO2 SERPL-SCNC: 26 MMOL/L — SIGNIFICANT CHANGE UP (ref 22–31)
COLOR SPEC: YELLOW — SIGNIFICANT CHANGE UP
CREAT SERPL-MCNC: 0.8 MG/DL — SIGNIFICANT CHANGE UP (ref 0.5–1.3)
DIFF PNL FLD: NEGATIVE — SIGNIFICANT CHANGE UP
GLUCOSE SERPL-MCNC: 106 MG/DL — HIGH (ref 70–99)
GLUCOSE UR QL: NEGATIVE — SIGNIFICANT CHANGE UP
HCT VFR BLD CALC: 31.7 % — LOW (ref 39–50)
HGB BLD-MCNC: 9.3 G/DL — LOW (ref 13–17)
KETONES UR-MCNC: NEGATIVE — SIGNIFICANT CHANGE UP
LDH SERPL L TO P-CCNC: 168 U/L — SIGNIFICANT CHANGE UP (ref 50–242)
LEUKOCYTE ESTERASE UR-ACNC: NEGATIVE — SIGNIFICANT CHANGE UP
MAGNESIUM SERPL-MCNC: 2 MG/DL — SIGNIFICANT CHANGE UP (ref 1.6–2.6)
MCHC RBC-ENTMCNC: 21.3 PG — LOW (ref 27–34)
MCHC RBC-ENTMCNC: 29.3 GM/DL — LOW (ref 32–36)
MCV RBC AUTO: 72.5 FL — LOW (ref 80–100)
NITRITE UR-MCNC: NEGATIVE — SIGNIFICANT CHANGE UP
NRBC # BLD: 0 /100 WBCS — SIGNIFICANT CHANGE UP (ref 0–0)
PH UR: 6.5 — SIGNIFICANT CHANGE UP (ref 5–8)
PHOSPHATE SERPL-MCNC: 2.4 MG/DL — LOW (ref 2.5–4.5)
PLATELET # BLD AUTO: 262 K/UL — SIGNIFICANT CHANGE UP (ref 150–400)
POTASSIUM SERPL-MCNC: 3.4 MMOL/L — LOW (ref 3.5–5.3)
POTASSIUM SERPL-SCNC: 3.4 MMOL/L — LOW (ref 3.5–5.3)
PROT UR-MCNC: ABNORMAL
RBC # BLD: 4.37 M/UL — SIGNIFICANT CHANGE UP (ref 4.2–5.8)
RBC # FLD: 17.6 % — HIGH (ref 10.3–14.5)
SODIUM SERPL-SCNC: 142 MMOL/L — SIGNIFICANT CHANGE UP (ref 135–145)
SP GR SPEC: >1.05 (ref 1.01–1.02)
UROBILINOGEN FLD QL: NEGATIVE — SIGNIFICANT CHANGE UP
WBC # BLD: 12.63 K/UL — HIGH (ref 3.8–10.5)
WBC # FLD AUTO: 12.63 K/UL — HIGH (ref 3.8–10.5)

## 2019-11-11 PROCEDURE — 74176 CT ABD & PELVIS W/O CONTRAST: CPT | Mod: 26

## 2019-11-11 PROCEDURE — 99223 1ST HOSP IP/OBS HIGH 75: CPT

## 2019-11-11 RX ORDER — ENOXAPARIN SODIUM 100 MG/ML
40 INJECTION SUBCUTANEOUS DAILY
Refills: 0 | Status: DISCONTINUED | OUTPATIENT
Start: 2019-11-11 | End: 2019-11-11

## 2019-11-11 RX ORDER — HYDROCORTISONE 1 %
1 OINTMENT (GRAM) TOPICAL ONCE
Refills: 0 | Status: COMPLETED | OUTPATIENT
Start: 2019-11-11 | End: 2019-01-01

## 2019-11-11 RX ORDER — BUPROPION HYDROCHLORIDE 150 MG/1
100 TABLET, EXTENDED RELEASE ORAL DAILY
Refills: 0 | Status: DISCONTINUED | OUTPATIENT
Start: 2019-11-11 | End: 2019-11-11

## 2019-11-11 RX ORDER — PANTOPRAZOLE SODIUM 20 MG/1
40 TABLET, DELAYED RELEASE ORAL DAILY
Refills: 0 | Status: DISCONTINUED | OUTPATIENT
Start: 2019-11-11 | End: 2019-01-01

## 2019-11-11 RX ORDER — TAMSULOSIN HYDROCHLORIDE 0.4 MG/1
0.4 CAPSULE ORAL AT BEDTIME
Refills: 0 | Status: DISCONTINUED | OUTPATIENT
Start: 2019-11-11 | End: 2019-01-01

## 2019-11-11 RX ORDER — POTASSIUM CHLORIDE 20 MEQ
10 PACKET (EA) ORAL
Refills: 0 | Status: COMPLETED | OUTPATIENT
Start: 2019-11-11 | End: 2019-11-11

## 2019-11-11 RX ORDER — RIVAROXABAN 15 MG-20MG
20 KIT ORAL
Refills: 0 | Status: DISCONTINUED | OUTPATIENT
Start: 2019-11-11 | End: 2019-01-01

## 2019-11-11 RX ORDER — BUPROPION HYDROCHLORIDE 150 MG/1
100 TABLET, EXTENDED RELEASE ORAL DAILY
Refills: 0 | Status: DISCONTINUED | OUTPATIENT
Start: 2019-11-11 | End: 2019-01-01

## 2019-11-11 RX ORDER — FINASTERIDE 5 MG/1
5 TABLET, FILM COATED ORAL DAILY
Refills: 0 | Status: DISCONTINUED | OUTPATIENT
Start: 2019-11-11 | End: 2019-01-01

## 2019-11-11 RX ORDER — LISINOPRIL 2.5 MG/1
5 TABLET ORAL DAILY
Refills: 0 | Status: DISCONTINUED | OUTPATIENT
Start: 2019-11-11 | End: 2019-01-01

## 2019-11-11 RX ORDER — SODIUM CHLORIDE 9 MG/ML
1000 INJECTION, SOLUTION INTRAVENOUS ONCE
Refills: 0 | Status: COMPLETED | OUTPATIENT
Start: 2019-11-11 | End: 2019-11-11

## 2019-11-11 RX ORDER — SODIUM CHLORIDE 9 MG/ML
1000 INJECTION, SOLUTION INTRAVENOUS
Refills: 0 | Status: DISCONTINUED | OUTPATIENT
Start: 2019-11-11 | End: 2019-11-11

## 2019-11-11 RX ADMIN — SODIUM CHLORIDE 125 MILLILITER(S): 9 INJECTION, SOLUTION INTRAVENOUS at 12:39

## 2019-11-11 RX ADMIN — Medication 100 MILLIEQUIVALENT(S): at 18:30

## 2019-11-11 RX ADMIN — TAMSULOSIN HYDROCHLORIDE 0.4 MILLIGRAM(S): 0.4 CAPSULE ORAL at 21:06

## 2019-11-11 RX ADMIN — SODIUM CHLORIDE 1000 MILLILITER(S): 9 INJECTION, SOLUTION INTRAVENOUS at 01:00

## 2019-11-11 RX ADMIN — RIVAROXABAN 20 MILLIGRAM(S): KIT at 18:31

## 2019-11-11 RX ADMIN — Medication 100 MILLIEQUIVALENT(S): at 17:15

## 2019-11-11 RX ADMIN — ENOXAPARIN SODIUM 40 MILLIGRAM(S): 100 INJECTION SUBCUTANEOUS at 12:39

## 2019-11-11 RX ADMIN — PANTOPRAZOLE SODIUM 40 MILLIGRAM(S): 20 TABLET, DELAYED RELEASE ORAL at 12:39

## 2019-11-11 RX ADMIN — SODIUM CHLORIDE 125 MILLILITER(S): 9 INJECTION, SOLUTION INTRAVENOUS at 02:55

## 2019-11-11 RX ADMIN — Medication 85 MILLIMOLE(S): at 23:00

## 2019-11-11 RX ADMIN — Medication 100 MILLIEQUIVALENT(S): at 12:38

## 2019-11-11 NOTE — H&P ADULT - NSICDXPASTSURGICALHX_GEN_ALL_CORE_FT
PAST SURGICAL HISTORY:  Melanoma of Scalp, Excision    Neoplasm, Left  shoulder Excison    S/P Cholecystectomy 1970    Umbilical hernia repair with mesh 61.2

## 2019-11-11 NOTE — CONSULT NOTE ADULT - SUBJECTIVE AND OBJECTIVE BOX
HPI:  79M hx of open flaco and ventral hernia repair with mesh, Factor 12 deficiency, DVT/PE on Xarelto, depression, TREVER who presents with one day of abdominal pain. Pt found to have SBO likely in the setting of adhesions from prior sx      Vital Signs Last 24 Hrs  T(C): 36.8 (11 Nov 2019 01:00), Max: 36.8 (10 Nov 2019 19:55)  T(F): 98.2 (11 Nov 2019 01:00), Max: 98.3 (10 Nov 2019 19:55)  HR: 101 (11 Nov 2019 01:00) (88 - 102)  BP: 107/66 (11 Nov 2019 01:00) (107/66 - 127/95)  BP(mean): 79 (11 Nov 2019 01:00) (79 - 79)  RR: 17 (11 Nov 2019 01:00) (17 - 18)  SpO2: 96% (11 Nov 2019 01:00) (96% - 100%) (11 Nov 2019 02:28)      PAST MEDICAL & SURGICAL HISTORY:  Depression  Hemorrhoid  TREVER (obstructive sleep apnea)  DVT (deep venous thrombosis): s/p IVC Filter  PE (pulmonary embolism)  Factor XII deficiency  Obesity  Diverticulitis of Colon with Bleeding  Malignant Melanoma of Skin of Nose  Obstructive Sleep Apnea: uses is CPAP prn  BPH (Benign Prostatic Hyperplasia)  HTN (Hypertension)  Umbilical hernia: repair with mesh  Neoplasm, Left  shoulder: Excison  Melanoma of Scalp,: Excision  S/P Cholecystectomy: 1970      Review of Systems:   CONSTITUTIONAL: No fever  EYES: No eye pain  ENMT:  No difficulty hearing  NECK: No pain or stiffness  RESPIRATORY: No cough, wheezing, chills or hemoptysis; No shortness of breath  CARDIOVASCULAR: No chest pain, palpitations, dizziness, or leg swelling  GASTROINTESTINAL: abdominal pain better, passing flatus, no bm   GENITOURINARY: No dysuria  NEUROLOGICAL: No headaches, memory loss, loss of strength, numbness, or tremors  SKIN: No itching, burning, rashes, or lesions   ENDOCRINE: No heat or cold intolerance;   MUSCULOSKELETAL: No joint pain or swelling;  PSYCHIATRIC: No depression  ALLERY AND IMMUNOLOGIC: No hives or eczema    Allergies    No Known Allergies    Intolerances        Social History: no smoking or etoh use     FAMILY HISTORY: non pertinent      MEDICATIONS  (STANDING):  enoxaparin Injectable 40 milliGRAM(s) SubCutaneous daily  lactated ringers. 1000 milliLiter(s) (125 mL/Hr) IV Continuous <Continuous>  pantoprazole  Injectable 40 milliGRAM(s) IV Push daily  potassium chloride  10 mEq/100 mL IVPB 10 milliEquivalent(s) IV Intermittent every 1 hour  sodium phosphate IVPB 30 milliMole(s) IV Intermittent once    MEDICATIONS  (PRN):        CAPILLARY BLOOD GLUCOSE        I&O's Summary    10 Nov 2019 07:01  -  11 Nov 2019 07:00  --------------------------------------------------------  IN: 500 mL / OUT: 1395 mL / NET: -895 mL    11 Nov 2019 07:01  -  11 Nov 2019 15:27  --------------------------------------------------------  IN: 0 mL / OUT: 305 mL / NET: -305 mL        PHYSICAL EXAM:  GENERAL: NAD, well-developed  HEAD:  Atraumatic, Normocephalic  EYES: conjunctiva and sclera clear  NECK:  No JVD  CHEST/LUNG: Clear to auscultation bilaterally; No wheeze  HEART: Regular rate and rhythm; S1S2  ABDOMEN: Soft, Nontender, Nondistended; Bowel sounds present  EXTREMITIES:  2+ Peripheral Pulses, No clubbing, cyanosis, or edema  PSYCH: AAOx3  NEUROLOGY: non-focal  SKIN: No rashes or lesions    LABS:                        9.3    12.63 )-----------( 262      ( 11 Nov 2019 07:16 )             31.7     11-11    142  |  105  |  24<H>  ----------------------------<  106<H>  3.4<L>   |  26  |  0.80    Ca    9.5      11 Nov 2019 07:15  Phos  2.4     11-11  Mg     2.0     11-11    TPro  7.4  /  Alb  3.9  /  TBili  0.4  /  DBili  x   /  AST  37  /  ALT  26  /  AlkPhos  160<H>  11-10    PT/INR - ( 10 Nov 2019 20:50 )   PT: 15.1 sec;   INR: 1.30 ratio         PTT - ( 10 Nov 2019 20:50 )  PTT:>200.0 sec      Urinalysis Basic - ( 11 Nov 2019 01:18 )    Color: Yellow / Appearance: Clear / SG: >1.050 / pH: x  Gluc: x / Ketone: Negative  / Bili: Negative / Urobili: Negative   Blood: x / Protein: 30 mg/dL / Nitrite: Negative   Leuk Esterase: Negative / RBC: 2 /hpf / WBC 1 /HPF   Sq Epi: x / Non Sq Epi: 1 /hpf / Bacteria: Negative        RADIOLOGY & ADDITIONAL TESTS:    Imaging Personally Reviewed:    Consultant(s) Notes Reviewed:  sx    Care Discussed with Consultants/Other Providers: sx

## 2019-11-11 NOTE — H&P ADULT - ATTENDING COMMENTS
patient is completely non toxic in appearance  no clinical signs of strangulation although WBC elevated  the patient has a soft non tender abdomen with some epigastric distention  as noted by CT multiple transition points of SBO,  I personally reviewed the images with radiology and there is no closed loop.  There areas of obstruction that are remote and not involving a loop or loops of oowel and no findings of strangulation by CT  The hernia periumbilical is easily reducible on my exam  The patient reported to be passing flatus  Patient expressed intent to not wish for surgery at my initial assessment, and I don't feel that this is an unsafe option given report of no closed loop obstruction and unclear nature of obstruction within a hernia that is reducible.  I explained that we would closely observe for now but would have low threshold to further consider surgery

## 2019-11-11 NOTE — PROGRESS NOTE ADULT - ASSESSMENT
79M w/ SBO, likely adhesive    - NPO/IVF  - Will consider NG tube replacement although patients pain resolved with return of GI function  - trend labs, reassess  - serial abdominal exams  - holding Xarelto  - OOB/ambulate      ACS [id number] 79M w/ SBO, likely adhesive    - NPO/IVF  - Will consider NG tube replacement although patients pain resolved with return of GI function  - Repeat CT scan today  - trend labs, reassess  - serial abdominal exams  - holding Xarelto  - OOB/ambulate      ACS [id number]

## 2019-11-11 NOTE — PROGRESS NOTE ADULT - SUBJECTIVE AND OBJECTIVE BOX
ACS SURGERY DAILY PROGRESS NOTE:       SUBJECTIVE/ROS: Patient seen and examined at bedside.  Patients NG tube fell out overnight.  Patient states he had return of GI function including bowel movements.  He state his pain is resolved since admission.      MEDICATIONS  (STANDING):  enoxaparin Injectable 40 milliGRAM(s) SubCutaneous daily  lactated ringers. 1000 milliLiter(s) (125 mL/Hr) IV Continuous <Continuous>  pantoprazole  Injectable 40 milliGRAM(s) IV Push daily    MEDICATIONS  (PRN):      OBJECTIVE:    Vital Signs Last 24 Hrs  T(C): 37.1 (11 Nov 2019 04:44), Max: 37.6 (11 Nov 2019 02:59)  T(F): 98.8 (11 Nov 2019 04:44), Max: 99.7 (11 Nov 2019 02:59)  HR: 89 (11 Nov 2019 04:44) (88 - 102)  BP: 121/7 (11 Nov 2019 04:44) (107/66 - 145/82)  BP(mean): 79 (11 Nov 2019 01:00) (79 - 79)  RR: 18 (11 Nov 2019 04:44) (17 - 18)  SpO2: 97% (11 Nov 2019 04:44) (96% - 100%)        I&O's Detail    10 Nov 2019 07:01  -  11 Nov 2019 07:00  --------------------------------------------------------  IN:    lactated ringers.: 500 mL  Total IN: 500 mL    OUT:    Nasoenteral Tube: 1275 mL    Voided: 120 mL  Total OUT: 1395 mL    Total NET: -895 mL          Daily Height in cm: 165.1 (10 Nov 2019 19:55)    Daily     LABS:                        9.3    12.63 )-----------( 262      ( 11 Nov 2019 07:16 )             31.7     11-10    142  |  102  |  27<H>  ----------------------------<  151<H>  4.2   |  25  |  0.93    Ca    10.6<H>      10 Nov 2019 20:50    TPro  7.4  /  Alb  3.9  /  TBili  0.4  /  DBili  x   /  AST  37  /  ALT  26  /  AlkPhos  160<H>  11-10    PT/INR - ( 10 Nov 2019 20:50 )   PT: 15.1 sec;   INR: 1.30 ratio         PTT - ( 10 Nov 2019 20:50 )  PTT:>200.0 sec  Urinalysis Basic - ( 11 Nov 2019 01:18 )    Color: Yellow / Appearance: Clear / SG: >1.050 / pH: x  Gluc: x / Ketone: Negative  / Bili: Negative / Urobili: Negative   Blood: x / Protein: 30 mg/dL / Nitrite: Negative   Leuk Esterase: Negative / RBC: 2 /hpf / WBC 1 /HPF   Sq Epi: x / Non Sq Epi: 1 /hpf / Bacteria: Negative                PHYSICAL EXAM:  Constitutional: well developed, well nourished, NAD  Respiratory: No increased WOB  Abdomen: soft, mildly distended, no tenderness to palpation  Psychiatric: oriented x 3; appropriate

## 2019-11-11 NOTE — H&P ADULT - NSICDXPASTMEDICALHX_GEN_ALL_CORE_FT
PAST MEDICAL HISTORY:  BPH (Benign Prostatic Hyperplasia)     Depression     Diverticulitis of Colon with Bleeding     DVT (deep venous thrombosis) s/p IVC Filter    Factor XII deficiency     Hemorrhoid     HTN (Hypertension)     Malignant Melanoma of Skin of Nose     Obesity     Obstructive Sleep Apnea uses is CPAP prn    TREVER (obstructive sleep apnea)     PE (pulmonary embolism)

## 2019-11-11 NOTE — H&P ADULT - HISTORY OF PRESENT ILLNESS
79M hx of open flaco and ventral hernia repair with mesh, Factor 12 deficiency, DVT/PE on Xarelto who presents with one day of abdominal pain. He denies nausea/vomiting. He has been passing gas uninterruptedly. Last BM yesterday. He denies fever/chills. He denies loose stools. He denies CP/SOB. He was found on CT scan to have SBO with the possibility of two transition points.  Vital Signs Last 24 Hrs  T(C): 36.8 (11 Nov 2019 01:00), Max: 36.8 (10 Nov 2019 19:55)  T(F): 98.2 (11 Nov 2019 01:00), Max: 98.3 (10 Nov 2019 19:55)  HR: 101 (11 Nov 2019 01:00) (88 - 102)  BP: 107/66 (11 Nov 2019 01:00) (107/66 - 127/95)  BP(mean): 79 (11 Nov 2019 01:00) (79 - 79)  RR: 17 (11 Nov 2019 01:00) (17 - 18)  SpO2: 96% (11 Nov 2019 01:00) (96% - 100%)

## 2019-11-11 NOTE — CONSULT NOTE ADULT - ASSESSMENT
79M hx of open flaco and ventral hernia repair with mesh, Factor 12 deficiency, HTN, DVT/PE on Xarelto, HTN, depression, TREVER who presents with one day of abdominal pain. Pt found to have SBO likely in the setting of adhesions from prior sx

## 2019-11-11 NOTE — H&P ADULT - NSHPLABSRESULTS_GEN_ALL_CORE
Remains on Xarelto for stroke prevention due to paroxysmal atrial fibrillation.  He does follow-up with Dr. Ferraro the cardiologist   11-10    142  |  102  |  27<H>  ----------------------------<  151<H>  4.2   |  25  |  0.93    Ca    10.6<H>      10 Nov 2019 20:50    TPro  7.4  /  Alb  3.9  /  TBili  0.4  /  DBili  x   /  AST  37  /  ALT  26  /  AlkPhos  160<H>  11-10    CBC Full  -  ( 10 Nov 2019 20:50 )  WBC Count : 15.53 K/uL  RBC Count : 5.04 M/uL  Hemoglobin : 11.2 g/dL  Hematocrit : 37.5 %  Platelet Count - Automated : 301 K/uL  Mean Cell Volume : 74.4 fl  Mean Cell Hemoglobin : 22.2 pg  Mean Cell Hemoglobin Concentration : 29.9 gm/dL  Auto Neutrophil # : 13.94 K/uL  Auto Lymphocyte # : 0.78 K/uL  Auto Monocyte # : 0.70 K/uL  Auto Eosinophil # : 0.00 K/uL  Auto Basophil # : 0.02 K/uL  Auto Neutrophil % : 89.8 %  Auto Lymphocyte % : 5.0 %  Auto Monocyte % : 4.5 %  Auto Eosinophil % : 0.0 %  Auto Basophil % : 0.1 %    < from: CT Abdomen and Pelvis w/ Oral Cont and w/ IV Cont (11.10.19 @ 22:24) >    INTERPRETATION:  CLINICAL INFORMATION: Abdominal pain and distention   concern for obstruction     COMPARISON: CT the abdomen 12/11/2015    PROCEDURE:   CT of the Abdomen and Pelvis was performed with intravenous contrast.   Intravenous contrast: 90 ml Omnipaque 350. 10 ml discarded.  Oral contrast: positive contrast was administered.  Sagittal and coronal reformats were performed.    FINDINGS:    LOWER CHEST: Trace bilateral pleural effusions.    LIVER: Within normal limits.  BILE DUCTS: Normal caliber.  GALLBLADDER: Cholecystectomy.  SPLEEN: Within normal limits.  PANCREAS: Within normal limits.  ADRENALS: Within normal limits.  KIDNEYS/URETERS: Symmetric enhancement no hydronephrosis. Bilateral   subcentimeter hypoattenuating foci too small to characterize.    BLADDER: Within normal limits.  REPRODUCTIVE ORGANS: Prostate within normal limits.    BOWEL:   There are 2 separately arisingsmall bowel transition points the first   proximal transition point (series 2 image 68) is seen within the proximal   small bowel.     There is underlying malrotation of the small bowel with the proximal   bowel loops located in the right hemiabdomen.     There is a second separately arising small bowel transition point within   the left lower quadrant anterior abdominal wall hernia series 2 image 91.   Appendix is normal.    PERITONEUM: No ascites.  VESSELS: IVC filter.  RETROPERITONEUM/LYMPH NODES: No lymphadenopathy.    ABDOMINAL WALL: Left lower quadrant anterior abdominal wall hernia, which   appears unchanged from 2/11/2015.  BONES: Degenerative changes. Multiple left-sided anterior rib deformities.    IMPRESSION:   Small bowel obstruction with multiple transition points as delineated   above.    Results discussed with Dr. Diggs by Dr. Wagner at 11:15 PM      < end of copied text >

## 2019-11-11 NOTE — H&P ADULT - NSHPPHYSICALEXAM_GEN_ALL_CORE
NAD, awake and alert  No rashes  No jaundice or scleral icterus  Respirations nonlabored  CV Regular  Abdomen softly distended, obese, nontender  No guarding or rebound tenderness  Midline incision well healed  L of midline a nontender bulge can be partly reduced  Extremities warm

## 2019-11-11 NOTE — CONSULT NOTE ADULT - PROBLEM SELECTOR RECOMMENDATION 9
Pt found to have SBO likely in the setting of adhesions from prior sx  Follow up repeat CT A/P  Keep NPO   IVF  Sx follow up

## 2019-11-11 NOTE — ED PROCEDURE NOTE - PROCEDURE ADDITIONAL DETAILS
POCUS: Emergency Department Focused Ultrasound performed at patient's bedside.  The complete report will be available in PACS.
small amounts of left nare bleeding

## 2019-11-11 NOTE — H&P ADULT - ASSESSMENT
79M w/ SBO, likely adhesive    - admit to [facility] surgery  - given patient's exam, the possibility of reduction of the second transition point, clearing of lactate, the surgical team feels that this is not a true closed loop obstruction that must be rushed to OR  - will resuscitate, trend labs, reassess  - serial exams  - hold Xarelto  - seen and discussed with surgical team including Dr. Quintero, reviewed with radiology, and discussed with patient and son who were in agreement    YSABEL Charles MD  ATP   [id number] 79M w/ SBO, likely adhesive    - admit to ATP surgery  - given patient's exam, the possibility of reduction of the second transition point, clearing of lactate, the surgical team feels that this is not a true closed loop obstruction that must be rushed to OR  - will resuscitate, trend labs, reassess  - serial exams  - hold Xarelto  - normotensive so will hold antihypertensives  - seen and discussed with surgical team including Dr. Quintero, reviewed with radiology, and discussed with patient and son who were in agreement    YSABEL Charles MD  ATP   [id number]

## 2019-11-12 NOTE — PROGRESS NOTE ADULT - SUBJECTIVE AND OBJECTIVE BOX
Patient is a 79y old  Male who presents with a chief complaint of SBO (12 Nov 2019 07:17)      SUBJECTIVE / OVERNIGHT EVENTS: feels better, had bm, had flatus, no cp, sob    MEDICATIONS  (STANDING):  buPROPion  milliGRAM(s) Oral daily  finasteride 5 milliGRAM(s) Oral daily  hydrocortisone 1% Cream 1 Application(s) Topical once  influenza   Vaccine 0.5 milliLiter(s) IntraMuscular once  lisinopril 5 milliGRAM(s) Oral daily  pantoprazole  Injectable 40 milliGRAM(s) IV Push daily  rivaroxaban 20 milliGRAM(s) Oral with dinner  tamsulosin 0.4 milliGRAM(s) Oral at bedtime    MEDICATIONS  (PRN):        CAPILLARY BLOOD GLUCOSE        I&O's Summary    11 Nov 2019 07:01  -  12 Nov 2019 07:00  --------------------------------------------------------  IN: 340 mL / OUT: 1930 mL / NET: -1590 mL    12 Nov 2019 07:01  -  12 Nov 2019 13:10  --------------------------------------------------------  IN: 460 mL / OUT: 375 mL / NET: 85 mL        PHYSICAL EXAM:  GENERAL: NAD, well-developed  HEAD:  Atraumatic, Normocephalic  EYES: conjunctiva and sclera clear  NECK:  No JVD  CHEST/LUNG: Clear to auscultation bilaterally; No wheeze  HEART: Regular rate and rhythm; S1S2  ABDOMEN: Soft, Nontender, Nondistended; Bowel sounds present  EXTREMITIES:  2+ Peripheral Pulses, No clubbing, cyanosis, or edema  PSYCH: AAOx3  NEUROLOGY: non-focal  SKIN: No rashes or lesions    LABS:                        8.3    11.06 )-----------( 218      ( 12 Nov 2019 11:01 )             29.1     11-12    138  |  103  |  16  ----------------------------<  81  3.6   |  25  |  0.84    Ca    9.0      12 Nov 2019 09:26  Phos  3.0     11-12  Mg     1.9     11-12    TPro  7.4  /  Alb  3.9  /  TBili  0.4  /  DBili  x   /  AST  37  /  ALT  26  /  AlkPhos  160<H>  11-10    PT/INR - ( 10 Nov 2019 20:50 )   PT: 15.1 sec;   INR: 1.30 ratio         PTT - ( 10 Nov 2019 20:50 )  PTT:>200.0 sec      Urinalysis Basic - ( 11 Nov 2019 01:18 )    Color: Yellow / Appearance: Clear / SG: >1.050 / pH: x  Gluc: x / Ketone: Negative  / Bili: Negative / Urobili: Negative   Blood: x / Protein: 30 mg/dL / Nitrite: Negative   Leuk Esterase: Negative / RBC: 2 /hpf / WBC 1 /HPF   Sq Epi: x / Non Sq Epi: 1 /hpf / Bacteria: Negative        RADIOLOGY & ADDITIONAL TESTS:    Imaging Personally Reviewed:    Consultant(s) Notes Reviewed:  sx    Care Discussed with Consultants/Other Providers: sx

## 2019-11-12 NOTE — PHYSICAL THERAPY INITIAL EVALUATION ADULT - PERTINENT HX OF CURRENT PROBLEM, REHAB EVAL
80 y/o M, PMH: of open flaco and ventral hernia repair with mesh, Factor 12 deficiency, DVT/PE on Xarelto. Pt p/w one day of abdominal pain, found to have SBO. He denies nausea/vomiting. CT abdomen & pelvis: (-). CXR 11/10: Small left pleural effusion. US abdomen: Multiple loops of dilated bowel seen which could represent a bowel obstruction.

## 2019-11-12 NOTE — PROGRESS NOTE ADULT - PROBLEM SELECTOR PLAN 1
Pt found to have SBO likely in the setting of adhesions from prior sx  repeat CT A/P with resolution of sbo  Diet advanced to low fiber  Sx follow up.

## 2019-11-12 NOTE — PROGRESS NOTE ADULT - ASSESSMENT
79M w/ SBO, likely adhesive, resolving    - Advance to LRD  - PO home meds resumed including Xarelto  - OOB/amb/IS  - Monitor GI function  - f/u AM labs    ACS [id number]

## 2019-11-12 NOTE — PROGRESS NOTE ADULT - SUBJECTIVE AND OBJECTIVE BOX
ACS SURGERY DAILY PROGRESS NOTE:       SUBJECTIVE/ROS: Patient seen and examined at bedside.  Patient states he is tolerating CLD.  He denies any n/v and reports continuing to pass flatus/BMs.         MEDICATIONS  (STANDING):  buPROPion  milliGRAM(s) Oral daily  finasteride 5 milliGRAM(s) Oral daily  hydrocortisone 1% Cream 1 Application(s) Topical once  lisinopril 5 milliGRAM(s) Oral daily  pantoprazole  Injectable 40 milliGRAM(s) IV Push daily  rivaroxaban 20 milliGRAM(s) Oral with dinner  tamsulosin 0.4 milliGRAM(s) Oral at bedtime    MEDICATIONS  (PRN):      OBJECTIVE:    Vital Signs Last 24 Hrs  T(C): 37.2 (12 Nov 2019 04:13), Max: 37.2 (12 Nov 2019 01:20)  T(F): 99 (12 Nov 2019 04:13), Max: 99 (12 Nov 2019 04:13)  HR: 76 (12 Nov 2019 04:13) (71 - 84)  BP: 122/76 (12 Nov 2019 04:13) (107/65 - 128/77)  BP(mean): --  RR: 18 (12 Nov 2019 04:13) (18 - 18)  SpO2: 94% (12 Nov 2019 04:13) (94% - 98%)        I&O's Detail    11 Nov 2019 07:01  -  12 Nov 2019 07:00  --------------------------------------------------------  IN:    IV PiggyBack: 100 mL    Oral Fluid: 240 mL  Total IN: 340 mL    OUT:    Voided: 1930 mL  Total OUT: 1930 mL    Total NET: -1590 mL          Daily     Daily     LABS:                        9.3    12.63 )-----------( 262      ( 11 Nov 2019 07:16 )             31.7     11-11    142  |  105  |  24<H>  ----------------------------<  106<H>  3.4<L>   |  26  |  0.80    Ca    9.5      11 Nov 2019 07:15  Phos  2.4     11-11  Mg     2.0     11-11    TPro  7.4  /  Alb  3.9  /  TBili  0.4  /  DBili  x   /  AST  37  /  ALT  26  /  AlkPhos  160<H>  11-10    PT/INR - ( 10 Nov 2019 20:50 )   PT: 15.1 sec;   INR: 1.30 ratio         PTT - ( 10 Nov 2019 20:50 )  PTT:>200.0 sec  Urinalysis Basic - ( 11 Nov 2019 01:18 )    Color: Yellow / Appearance: Clear / SG: >1.050 / pH: x  Gluc: x / Ketone: Negative  / Bili: Negative / Urobili: Negative   Blood: x / Protein: 30 mg/dL / Nitrite: Negative   Leuk Esterase: Negative / RBC: 2 /hpf / WBC 1 /HPF   Sq Epi: x / Non Sq Epi: 1 /hpf / Bacteria: Negative                PHYSICAL EXAM:  Constitutional: well developed, well nourished, NAD  Respiratory: No increased WOB  Gastrointestinal: abdomen soft, nontender, nondistended. No obvious masses. No peritonitis  Psych: A&Ox3

## 2019-11-12 NOTE — PHYSICAL THERAPY INITIAL EVALUATION ADULT - ADDITIONAL COMMENTS
Prior to admission pt reports being independent of all ADL's & functional mobility using RW intermittently if needed. Pt resides in apt with spouse, ramp entrance, and stair lift to bedroom. Pt owns hospital bed. (+) driving short distances.

## 2019-11-13 NOTE — PROGRESS NOTE ADULT - ASSESSMENT
79M w/ SBO, likely adhesive, resolved.    Plan:   - Continue regular diet  - d/c  - PT recs: home w/ assist; home w/ home PT

## 2019-11-13 NOTE — PROGRESS NOTE ADULT - SUBJECTIVE AND OBJECTIVE BOX
Patient is a 79y old  Male who presents with a chief complaint of SBO (13 Nov 2019 10:56)      SUBJECTIVE / OVERNIGHT EVENTS:    Events noted.  CONSTITUTIONAL: No fever,  or fatigue  RESPIRATORY: No cough, wheezing,  No shortness of breath  CARDIOVASCULAR: No chest pain, palpitations,   GASTROINTESTINAL: No abdominal or epigastric pain.   NEUROLOGICAL: No headaches,     MEDICATIONS  (STANDING):  acetaminophen   Tablet .. 975 milliGRAM(s) Oral once  buPROPion  milliGRAM(s) Oral daily  finasteride 5 milliGRAM(s) Oral daily  influenza   Vaccine 0.5 milliLiter(s) IntraMuscular once  lisinopril 5 milliGRAM(s) Oral daily  pantoprazole   Suspension 40 milliGRAM(s) Oral daily  rivaroxaban 20 milliGRAM(s) Oral with dinner  tamsulosin 0.4 milliGRAM(s) Oral at bedtime    MEDICATIONS  (PRN):        CAPILLARY BLOOD GLUCOSE        I&O's Summary    12 Nov 2019 07:01  -  13 Nov 2019 07:00  --------------------------------------------------------  IN: 820 mL / OUT: 1850 mL / NET: -1030 mL    13 Nov 2019 07:01  -  13 Nov 2019 23:53  --------------------------------------------------------  IN: 760 mL / OUT: 1050 mL / NET: -290 mL        PHYSICAL EXAM:    NECK: Supple, No JVD  CHEST/LUNG: Clear to auscultation bilaterally; No wheezing.  HEART: Regular rate and rhythm; No murmurs, rubs, or gallops  ABDOMEN: Soft, Nontender, Nondistended; Bowel sounds present  EXTREMITIES:   No edema  NEUROLOGY: AAO       LABS:                        8.3    11.06 )-----------( 218      ( 12 Nov 2019 11:01 )             29.1     11-12    138  |  103  |  16  ----------------------------<  81  3.6   |  25  |  0.84    Ca    9.0      12 Nov 2019 09:26  Phos  3.0     11-12  Mg     1.9     11-12              CAPILLARY BLOOD GLUCOSE        11-11 @ 06:57  Culture-urine --  Culture results   <10,000 CFU/mL Normal Urogenital Thais  method type --  Organism --  Organism Identification --  Specimen source .Urine Clean Catch (Midstream)           11-11 @ 06:57  Culture blood --  Culture results   <10,000 CFU/mL Normal Urogenital Thais  Gram stain --  Gram stain blood --  Method type --  Organism --  Organism identification --  Specimen source .Urine Clean Catch (Midstream)      RADIOLOGY & ADDITIONAL TESTS:    Imaging Personally Reviewed:    Consultant(s) Notes Reviewed:      Care Discussed with Consultants/Other Providers:

## 2019-11-13 NOTE — DISCHARGE NOTE PROVIDER - NSDCMRMEDTOKEN_GEN_ALL_CORE_FT
buPROPion 100 mg/12 hours (SR) oral tablet, extended release: 1 tab(s) orally once a day  Colestid 1 g oral tablet: 1 tab(s) orally once a day  finasteride 5 mg oral tablet: 1 tab(s) orally once a day  furosemide 40 mg oral tablet: 1 tab(s) orally once a day  lisinopril 5 mg oral tablet: 1 tab(s) orally once a day  omeprazole 20 mg oral delayed release capsule: 1 cap(s) orally once a day  rivaroxaban 20 mg oral tablet: 1 tab(s) orally once a day (before a meal)  tamsulosin 0.4 mg oral capsule: 1 cap(s) orally once a day (at bedtime)

## 2019-11-13 NOTE — DISCHARGE NOTE PROVIDER - NSDCCPCAREPLAN_GEN_ALL_CORE_FT
PRINCIPAL DISCHARGE DIAGNOSIS  Diagnosis: Small bowel obstruction  Assessment and Plan of Treatment: Resolution of Symptoms      SECONDARY DISCHARGE DIAGNOSES  Diagnosis: HTN (Hypertension)  Assessment and Plan of Treatment: HTN (Hypertension)    Diagnosis: BPH (Benign Prostatic Hyperplasia)  Assessment and Plan of Treatment: BPH (Benign Prostatic Hyperplasia)    Diagnosis: PE (pulmonary embolism)  Assessment and Plan of Treatment: Cont Xarelto

## 2019-11-13 NOTE — PROGRESS NOTE ADULT - SUBJECTIVE AND OBJECTIVE BOX
Subjective:  Patient seen and evaluated this AM with team.  Pain controlled.  Passing gas / BM. Tolerating diet.    OBJECTIVE:     ** PHYSICAL EXAM **    Gen: NAD, lying comfortably in bed  HEENT: NC/AT  RESP: nonlabored breathing  ABDOMEN: soft, non-distended, non-tender    ** VITAL SIGNS / I&O's **    Vital Signs Last 24 Hrs  T(C): 36.9 (13 Nov 2019 06:20), Max: 37.5 (13 Nov 2019 00:24)  T(F): 98.4 (13 Nov 2019 06:20), Max: 99.5 (13 Nov 2019 00:24)  HR: 70 (13 Nov 2019 06:20) (70 - 80)  BP: 124/80 (13 Nov 2019 06:20) (101/62 - 124/80)  BP(mean): --  RR: 18 (13 Nov 2019 06:20) (18 - 18)  SpO2: 96% (13 Nov 2019 06:20) (94% - 96%)      12 Nov 2019 07:01  -  13 Nov 2019 07:00  --------------------------------------------------------  IN:    Oral Fluid: 820 mL  Total IN: 820 mL    OUT:    Voided: 1850 mL  Total OUT: 1850 mL    Total NET: -1030 mL            ** LABS **                          8.3    11.06 )-----------( 218      ( 12 Nov 2019 11:01 )             29.1     12 Nov 2019 09:26    138    |  103    |  16     ----------------------------<  81     3.6     |  25     |  0.84     Ca    9.0        12 Nov 2019 09:26  Phos  3.0       12 Nov 2019 09:26  Mg     1.9       12 Nov 2019 09:26        CAPILLARY BLOOD GLUCOSE                Culture - Urine (collected 11 Nov 2019 06:57)  Source: .Urine Clean Catch (Midstream)  Final Report (12 Nov 2019 08:50):    <10,000 CFU/mL Normal Urogenital Thais          MEDICATIONS  (STANDING):  acetaminophen   Tablet .. 975 milliGRAM(s) Oral once  buPROPion  milliGRAM(s) Oral daily  finasteride 5 milliGRAM(s) Oral daily  influenza   Vaccine 0.5 milliLiter(s) IntraMuscular once  lisinopril 5 milliGRAM(s) Oral daily  pantoprazole  Injectable 40 milliGRAM(s) IV Push daily  rivaroxaban 20 milliGRAM(s) Oral with dinner  tamsulosin 0.4 milliGRAM(s) Oral at bedtime    MEDICATIONS  (PRN):

## 2019-11-13 NOTE — DISCHARGE NOTE PROVIDER - NSDCFUADDAPPT_GEN_ALL_CORE_FT
Please follow up with your Hospital Doctor Dr Bess only if needed at [address] [place] [address] [address] [address] , phone #[phone].

## 2019-11-13 NOTE — PROGRESS NOTE ADULT - ATTENDING COMMENTS
Pt seen and examined on 11/12, agree with above. I personally reviewed CT images done yesterday, which shows resolution of adhesive SBO. Pt feeling better, tolerating clear liquids. Will advance diet and resume home medications, including Xarelto for DVT/PE.
Pt seen and examined on 11/13, agree with above. Pt feeling well, tolerating diet. Home medications resumed. Patient feels very uncomfortable with being discharged today given his recent recovery from adhesive SBO and his advanced age. Discussed his concerns, will d/c tomorrow if he continues to have bowel function and tolerating diet.
Pt seen and examined, agree with above. Pt appears very comfortable, says he's not in any pain or discomfort and is hungry. No N/V. Had a large BM this morning. Pending repeat CT per Dr. Quintero's plan, as I discussed with him this morning.

## 2019-11-13 NOTE — DISCHARGE NOTE PROVIDER - CARE PROVIDER_API CALL
Jacquelin Bess (MD)  Surgery; Surgical Critical Care  [address] [facility], [address] [address]  Phone: [phone]  Fax: [phone]  Follow Up Time: Routine

## 2019-11-13 NOTE — PROGRESS NOTE ADULT - PROBLEM SELECTOR PLAN 1
Pt found to have SBO likely in the setting of adhesions from prior sx  repeat CT A/P with resolution of sbo  tolerates po  Sx follow up noted.

## 2019-11-13 NOTE — DISCHARGE NOTE PROVIDER - CARE PROVIDERS DIRECT ADDRESSES
[email or web address]

## 2019-11-13 NOTE — DISCHARGE NOTE PROVIDER - HOSPITAL COURSE
79M hx of open flaco and ventral hernia repair with mesh, Factor 12 deficiency, DVT/PE on Xarelto, depression, TREVER who presents with one day of abdominal pain. Pt found to have SBO likely in the setting of adhesions from prior sx.  An NGT was placed in the ED with 2000mL output. The periumbilical hernia is easily reducible on Dr Quintero exam.  Pt admitted and NGT fell out overnight. A repeat CT showed no evidence of small bowel obstruction. Oral contrast within the colon. Diet was advanced as tolerated. Physical Therapy recommended home w/ home PT. At the time of discharge, the patient was hemodynamically stable, tolerating PO diet, voiding urine, passing stool, ambulating and was comfortable with adequate pain control. The patient felt comfortable with discharge. The patient was discharged to home. The patient had no other issues.

## 2019-11-14 NOTE — DISCHARGE NOTE NURSING/CASE MANAGEMENT/SOCIAL WORK - NSDCFUADDAPPT_GEN_ALL_CORE_FT
Please follow up with your Hospital Doctor Dr Bess only if needed at [address] [place] [address] [address] [address] , phone #[phone].

## 2019-11-14 NOTE — PROGRESS NOTE ADULT - ASSESSMENT
79 year old male with adhesive SBO, now resolved.    Plan:  - Discharge home  - Low fiber diet    ACS & Trauma, [id number]

## 2019-11-14 NOTE — PROVIDER CONTACT NOTE (OTHER) - ACTION/TREATMENT ORDERED:
Spoke with MD, Plan to discharge in am.  Notified case management. No arrangement for discharge at this time possible.
MD at bedside. Discharge on hold.

## 2019-11-14 NOTE — PROVIDER CONTACT NOTE (OTHER) - SITUATION
Pt signed discharge paperwork. IV removed. pt jail dressed in home clothes and c/o unable to stand on left foot when finishing getting ready

## 2019-11-14 NOTE — DISCHARGE NOTE NURSING/CASE MANAGEMENT/SOCIAL WORK - NSSCTYPOFSERV_GEN_ALL_CORE
Physical therapy services.  An RN will contact you to prior to your initial visit.  Please call if you have any questions.

## 2019-11-14 NOTE — DISCHARGE NOTE NURSING/CASE MANAGEMENT/SOCIAL WORK - PATIENT PORTAL LINK FT
You can access the FollowMyHealth Patient Portal offered by [facility] by registering at the following website: [email or web address]. By joining [employer]’s FollowMyHealth portal, you will also be able to view your health information using other applications (apps) compatible with our system.

## 2019-11-14 NOTE — PROGRESS NOTE ADULT - SUBJECTIVE AND OBJECTIVE BOX
SUBJECTIVE: Pt seen and examined at bedside with chief resident.  Tolerating a diet, having flatus and bowel movements. No pain.     MEDICATIONS  (STANDING):  buPROPion  milliGRAM(s) Oral daily  finasteride 5 milliGRAM(s) Oral daily  influenza   Vaccine 0.5 milliLiter(s) IntraMuscular once  lisinopril 5 milliGRAM(s) Oral daily  pantoprazole   Suspension 40 milliGRAM(s) Oral daily  rivaroxaban 20 milliGRAM(s) Oral with dinner  tamsulosin 0.4 milliGRAM(s) Oral at bedtime    MEDICATIONS  (PRN):      OBJECTIVE:    Vital Signs Last 24 Hrs  T(C): 37 (14 Nov 2019 13:19), Max: 37.2 (13 Nov 2019 16:55)  T(F): 98.6 (14 Nov 2019 13:19), Max: 99 (13 Nov 2019 16:55)  HR: 71 (14 Nov 2019 13:19) (71 - 81)  BP: 115/69 (14 Nov 2019 13:19) (107/69 - 148/82)  BP(mean): --  RR: 18 (14 Nov 2019 13:19) (18 - 18)  SpO2: 96% (14 Nov 2019 13:19) (95% - 98%)    General Appearance: Resting comfortably, no acute distress  Chest: non-labored breathing, no respiratory distress  CV: Pulse regular presently  Abdomen: Soft, non-tender, mildly distended  Extremities: warm and well perfused    I&O's Summary    13 Nov 2019 07:01  -  14 Nov 2019 07:00  --------------------------------------------------------  IN: 760 mL / OUT: 1300 mL / NET: -540 mL    14 Nov 2019 07:01  -  14 Nov 2019 13:55  --------------------------------------------------------  IN: 750 mL / OUT: 225 mL / NET: 525 mL      I&O's Detail    13 Nov 2019 07:01 - 14 Nov 2019 07:00  --------------------------------------------------------  IN:    Oral Fluid: 760 mL  Total IN: 760 mL    OUT:    Voided: 1300 mL  Total OUT: 1300 mL    Total NET: -540 mL      14 Nov 2019 07:01 -  14 Nov 2019 13:55  --------------------------------------------------------  IN:    Oral Fluid: 750 mL  Total IN: 750 mL    OUT:    Voided: 225 mL  Total OUT: 225 mL    Total NET: 525 mL            LABS:                RADIOLOGY & ADDITIONAL STUDIES:

## 2019-12-27 NOTE — ED ADULT NURSE REASSESSMENT NOTE - NS ED NURSE REASSESS COMMENT FT1
Received report from Tisha GREENE. Pt complaining of abdominal pain 2/10. Medications administered as per EMAR.

## 2019-12-27 NOTE — ED ADULT NURSE REASSESSMENT NOTE - NS ED NURSE REASSESS COMMENT FT1
Pt reports no pain at this time, reports he feels much better. Safety and comfort measures maintained, bed remains locked and in lowest position, side rails up for safety. Wife remains at bedside. Awaiting CT at this time.

## 2019-12-27 NOTE — ED ADULT NURSE NOTE - OBJECTIVE STATEMENT
80 y/o Male presenting to the ED by EMS, A&Ox3, complaining of abdominal pain since last night. Pt hx of SBO and reports this pain feels similar to last time. Last bowel movement was yesterday before the pain started and pt reports it was normal. Denies CP, SOB, N/V/D, dark or tarry stools, dysuria, hematuria, headache, dizziness, fever, chills. Abdomen firm, distended and tender in the upper quadrants. Skin warm and dry, normal color for race. IV intact. Safety and comfort measures provided, bed locked and in lowest position, side rails up for safety. Awaiting all results at this time.

## 2019-12-27 NOTE — CONSULT NOTE ADULT - ASSESSMENT
79M Factor XII deficiency, 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 Hematology consulted for management of Factor 12 def.     #Factor 12 deficiency:  Hematology consulted for intraoperative recommendations, however pt not longer going to the OR and SBO is being managed conservatively.   Factor 12 deficiency does not pose as a high bleeding risk- if pt is to go for any future procedures, would not give any products except FFP if pt is actively bleeding   - Can check Factor XII level on this admission   - PTT is elevated in Factor 12 def, PT and INR can be elevated due to Xarelto   - Can resume Xarelto if pt is not planned for any further procedures     Odalis Morin MD  Hematology Oncology Fellow, PGY-4  [facility] Pager: [phone]/ [facility] Pager: [phone]

## 2019-12-27 NOTE — H&P ADULT - NSICDXPASTSURGICALHX_GEN_ALL_CORE_FT
PAST SURGICAL HISTORY:  Melanoma of Scalp, Excision    Neoplasm, Left  shoulder Excison    S/P Cholecystectomy 1970    Umbilical hernia repair with mesh

## 2019-12-27 NOTE — ED PROVIDER NOTE - SEVERE SEPSIS ALERT DETAILS
CHARMAINE Davenport MD:  I have seen and evaluated this patient and do not believe the patient is septic at this time.  I will continue to evaluate.

## 2019-12-27 NOTE — CONSULT NOTE ADULT - ATTENDING COMMENTS
80 y/o M with reported history of factor XII deficiency, history of DVT/PE s/p IVC filter on Xarelto here with SBO due to abdominal wall hernia currently with NGT to suction on conservative management. Factor XII level pending, but Factor XII deficiency not a risk factor for bleeding. There is some evidence that it may cause hypercoagulability. PTT elevated likely due to deficiency however not clinically significant. If needs urgent OR intervention, if any signs of active bleed can give FFP if concern for Xarelto reversal.

## 2019-12-27 NOTE — ED PROVIDER NOTE - OBJECTIVE STATEMENT
79m w hx HTN, factor XII deficiency, DVT/PE on Xarelto, open flaco and ventral hernia s/p repair w mesh, SBO, here today for abd pain x2 hrs. Pt was awoken from sleep by pain. Describes as severe, located across upper abdomen, constant since onset w/o radiation. No nausea or vomiting. Has not passed flatus since onset. Last bm yesterday. States feels similar to previous SBO.

## 2019-12-27 NOTE — CONSULT NOTE ADULT - SUBJECTIVE AND OBJECTIVE BOX
REASON FOR CONSULTATION:    HPI:    REVIEW OF SYSTEMS:    CONSTITUTIONAL: No weakness, fevers or chills  EYES/ENT: No visual changes;  No vertigo or throat pain   NECK: No pain or stiffness  RESPIRATORY: No cough, wheezing, hemoptysis; No shortness of breath  CARDIOVASCULAR: No chest pain or palpitations  GASTROINTESTINAL: No abdominal or epigastric pain. No nausea, vomiting, or hematemesis; No diarrhea or constipation. No melena or hematochezia.  GENITOURINARY: No dysuria, frequency or hematuria  NEUROLOGICAL: No numbness or weakness  SKIN: No itching, burning, rashes, or lesions   All other review of systems is negative unless indicated above.    Allergies    No Known Allergies    Intolerances        MEDICATIONS  (STANDING):  lactated ringers. 1000 milliLiter(s) (125 mL/Hr) IV Continuous <Continuous>    MEDICATIONS  (PRN):      Vital Signs Last 24 Hrs  T(C): 36.6 (27 Dec 2019 07:10), Max: 36.8 (27 Dec 2019 04:52)  T(F): 97.9 (27 Dec 2019 07:10), Max: 98.3 (27 Dec 2019 05:50)  HR: 86 (27 Dec 2019 07:10) (85 - 89)  BP: 137/84 (27 Dec 2019 07:10) (137/84 - 151/68)  BP(mean): --  RR: 16 (27 Dec 2019 07:10) (16 - 18)  SpO2: 98% (27 Dec 2019 07:10) (98% - 99%)    PHYSICAL EXAM:    General: AOx3, no acute distress  EYES: EOMI, PERRLA, conjunctiva and sclera clear  CHEST/LUNG: Clear to auscultation bilaterally; no wheezes, crackles or rales  HEART: Regular rate and rhythm; no murmurs  ABDOMEN: Soft, Nontender, Nondistended; BS+  LYMPH: No lymphadenopathy noted.  Extremities: No peripheral edema    LABS:                        9.5    16.67 )-----------( 290      ( 27 Dec 2019 05:02 )             35.6     12-27    142  |  106  |  32<H>  ----------------------------<  146<H>  4.0   |  23  |  0.91    Ca    10.6<H>      27 Dec 2019 05:02    TPro  7.7  /  Alb  3.9  /  TBili  0.4  /  DBili  x   /  AST  27  /  ALT  23  /  AlkPhos  177<H>  12-27    PT/INR - ( 27 Dec 2019 05:02 )   PT: 14.5 sec;   INR: 1.25 ratio         PTT - ( 27 Dec 2019 05:02 )  PTT:176.0 sec          RADIOLOGY & ADDITIONAL STUDIES:    PATHOLOGY: REASON FOR CONSULTATION: Factor XII deficiency     HPI: 79M Factor XII deficiency, 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 Hematology consulted for management of Factor 12 def.     Patient seen and examined at the bedside. He states that he is no longer going to the OR and that they placed an NG tube for conservative management. Pt does not routinely follow with a hematologist for his Factor Xii deficiency and he does not remember who diagnosed him. His last dose of Xarelto was last night. He does not endorse any significant bleeding episodes. Only clot history was DVT/PE 6 years ago.     REVIEW OF SYSTEMS:    CONSTITUTIONAL: No weakness, fevers or chills  EYES/ENT: No visual changes;  No vertigo or throat pain   NECK: No pain or stiffness  RESPIRATORY: No cough, wheezing, hemoptysis; No shortness of breath  CARDIOVASCULAR: No chest pain or palpitations  GASTROINTESTINAL: +abd pain. No nausea, vomiting, or hematemesis; No diarrhea or constipation. No melena or hematochezia.  GENITOURINARY: No dysuria, frequency or hematuria  NEUROLOGICAL: No numbness or weakness  SKIN: No itching, burning, rashes, or lesions   All other review of systems is negative unless indicated above.    PAST MEDICAL HISTORY:  BPH (Benign Prostatic Hyperplasia)     Depression     Diverticulitis of Colon with Bleeding     DVT (deep venous thrombosis) s/p IVC Filter    Factor XII deficiency     Hemorrhoid     HTN (Hypertension)     Malignant Melanoma of Skin of Nose     Obesity     Obstructive Sleep Apnea uses is CPAP prn    TREVER (obstructive sleep apnea)     PE (pulmonary embolism).     PAST SURGICAL HISTORY:  Melanoma of Scalp, Excision    Neoplasm, Left  shoulder Excison    S/P Cholecystectomy 1970    Umbilical hernia repair with mesh.      Allergies    No Known Allergies    Intolerances        MEDICATIONS  (STANDING):  lactated ringers. 1000 milliLiter(s) (125 mL/Hr) IV Continuous <Continuous>    MEDICATIONS  (PRN):      Vital Signs Last 24 Hrs  T(C): 36.6 (27 Dec 2019 07:10), Max: 36.8 (27 Dec 2019 04:52)  T(F): 97.9 (27 Dec 2019 07:10), Max: 98.3 (27 Dec 2019 05:50)  HR: 86 (27 Dec 2019 07:10) (85 - 89)  BP: 137/84 (27 Dec 2019 07:10) (137/84 - 151/68)  BP(mean): --  RR: 16 (27 Dec 2019 07:10) (16 - 18)  SpO2: 98% (27 Dec 2019 07:10) (98% - 99%)    PHYSICAL EXAM:    General: AOx3, no acute distress +NG tube in place  EYES: EOMI, PERRLA, conjunctiva and sclera clear  CHEST/LUNG: Clear to auscultation bilaterally; no wheezes, crackles or rales  HEART: Regular rate and rhythm; no murmurs  ABDOMEN: Soft, Nontender, +distended, BS+   LYMPH: No lymphadenopathy noted.  Extremities: No peripheral edema    LABS:                        9.5    16.67 )-----------( 290      ( 27 Dec 2019 05:02 )             35.6     12-27    142  |  106  |  32<H>  ----------------------------<  146<H>  4.0   |  23  |  0.91    Ca    10.6<H>      27 Dec 2019 05:02    TPro  7.7  /  Alb  3.9  /  TBili  0.4  /  DBili  x   /  AST  27  /  ALT  23  /  AlkPhos  177<H>  12-27    PT/INR - ( 27 Dec 2019 05:02 )   PT: 14.5 sec;   INR: 1.25 ratio         PTT - ( 27 Dec 2019 05:02 )  PTT:176.0 sec          RADIOLOGY & ADDITIONAL STUDIES:    < from: CT Abdomen and Pelvis w/ IV Cont (12.27.19 @ 06:48) >    EXAM:  CT ABDOMEN AND PELVIS IC                            *** ADDENDUM 12/27/2019  ***    Call Back:  Dr. Shipley discussed this case with Dr. Servin at 7:20 AM on 12/27/2019.  Hospital policies for call back including read back policy were followed. The verbal communication call back supplements this written report.      *** END OF ADDENDUM 12/27/2019  ***      PROCEDURE DATE:  12/27/2019            INTERPRETATION:  CLINICAL INFORMATION: Abdominal distention.    COMPARISON: CT abdomen pelvis 11/11/2019.    TECHNIQUE: Contrast enhanced CT of the abdomen and pelvis was performed with coronal and sagittal reformats. 100 cc Omnipaque 350 were administered intravenously and 0 cc were discarded. No oral contrast.     FINDINGS:    LOWER CHEST:Subsegmental atelectasis in the lingula and left lower lobe. Calcifications of the aortic valve and coronary arteries. Fluid distended esophagus.    HEPATOBILIARY: Cholecystectomy. Liver and bile ducts within normal limits.  PANCREAS: Atrophic.  SPLEEN: Within normal limits.  ADRENALS: Within normal limits.    KIDNEYS/URETERS/BLADDER: Symmetric nephrographic phase of renal enhancement. Bilateral renal cysts and hypodense lesions not well characterize. No hydronephrosis. Bladder within normal limits.  REPRODUCTIVE ORGANS: Within normal limits.    BOWEL/PERITONEUM: Obstructing left lower ventral abdominal wall hernia with dilated, gas and fluid-filled proximal small bowel, stomach and esophagus. No bowel wall thickening, mesenteric edema or ascites. No pneumoperitoneum. The distal small bowel and colon are collapsed. Normal appendix. Colonic diverticulosis. Portions of the gastrointestinal tract are collapsed, limiting evaluation.     VESSELS:  Moderate calcific atherosclerosis. Infrarenal inferior vena cava filter. No abdominal aortic aneurysm.  LYMPHATICS/RETROPERITONEUM: No lymphadenopathy. No retroperitoneal hematoma.      SOFT TISSUES: Postsurgical changes of the ventral abdominal wall. Ventral abdominal wall hernia mesh. Rectus diastasis. Partially imaged large left inguinal hernia containing sigmoid colon.  BONES: Multilevel spinal degenerative changes.    IMPRESSION: Complete small bowel obstruction secondary to a left lower ventral abdominal wall hernia. No bowel wall thickening, mesenteric edema, ascites or pneumoperitoneum.                        ***Please see the addendum at the top of this report. It may contain additional important information or changes.****          CECILIO WOLF M.D., ATTENDING RADIOLOGIST  This document has been electronically signed. Dec 27 2019  7:16AM  Addend:  CECILIO WOLF M.D., ATTENDING RADIOLOGIST  This addendum was electronically signed on: Dec 27 2019  7:28AM.        < end of copied text >      PATHOLOGY:

## 2019-12-27 NOTE — H&P ADULT - ASSESSMENT
79M PMH Factor XII deficiency, DVT/PE (s/p IVC filter, on Xarelto), HTN, TREVER, BPH, Hx open cholecystectomy, ventral wall hernia repair with mesh (many years ago), presenting with abdominal pain, found on imaging to have SBO with transition point in ventral wall hernia.    - Admit to ATP surgery  - NPO/IVF  - NGT to LCWS  - Plan for OR for open ventral wall hernia repair, possible bowel resection  - F/u Heme reccs - per Heme fellow, elevated PTT likely 2/2 Xarelto. No need to administer reversal agents prior to OR however if there is concern for bleeding intra-operatively, would give FFP. Will obtain Factor XII assay.  - Discussed with Dr. Pepito Negron PGY3  ATP surgery  [id number] 79M PMH Factor XII deficiency, DVT/PE (s/p IVC filter, on Xarelto), HTN, TREVER, BPH, Hx open cholecystectomy, ventral wall hernia repair with mesh (many years ago), presenting with abdominal pain, found on imaging to have SBO with transition point in ventral wall hernia.    - Admit to ATP surgery  - NPO/IVF  - NGT to LCWS  - Plan to resuscitate and observe - if no improvement - or clinically deteriorate will require exploration  - F/u Heme reccs - per Heme fellow, elevated PTT likely 2/2 Xarelto. No need to administer reversal agents prior to OR however if there is concern for bleeding intra-operatively, would give FFP. Will obtain Factor XII assay.  - Discussed with Dr. Deniz Negron PGY3  Red surgery  [id number]

## 2019-12-27 NOTE — H&P ADULT - ATTENDING COMMENTS
pt seen and examined.  agree with note above  79m with complex surgical hx with recurrent Ventral hernia and sbo   pt much less distended, since ngt placed  reports + flatus,  abd softly distended, nt, hernia reducible   would continue nonoperative rx for now  f/u rpt labs and serial exams  if pt fails to improve or deteriorates - will likely need eventual exploration  d/w pt and family in detail who agree with plan stated above.

## 2019-12-27 NOTE — ED PROCEDURE NOTE - ATTENDING CONTRIBUTION TO CARE
"  Discussion/Summary  Normal device function      Results/Data  Cardiac Device TTM [id number] 04:26PM Jose Quiñones     Test Name Result Flag Reference   Cardiac Electrophysiology Report      [id number]  pdf   DEVICE TYPE Pacemaker     MISCELLANEOUS COMMENT (Report)     1ST STAGE, CONTINUE TO MONITOR VIA TTMS  NON-MAGNET= A-V SEQUENTIAL PACING WITH CAPTURE WITH RARE VENTRICULAR PACING  MAGNET= ASYNCHRONOUS A-V PACING WITH CAPTURE  POST-MAGNET= AV SEQUENTIAL PACING WITH CAPTURE WITH RARE VENTRICULAR PACING  NORMAL DEVICE FUNCTION   DL     Signatures   Electronically signed by : Dwain Diaz, ; Dec 28 2017 11:36AM EST                       (Author)    Electronically signed by : Asha Rene DO; Jan 2 2018  1:18PM EST                       (Author)    " I saw and evaluated patient with resident. I discussed H+P and MDM with resident. I agree with the statements made by the resident unless otherwise noted.

## 2019-12-27 NOTE — ED PROVIDER NOTE - CLINICAL SUMMARY MEDICAL DECISION MAKING FREE TEXT BOX
79m w hx HTN, factor XII deficiency, DVT/PE on Xarelto, open flaco and ventral hernia s/p repair w mesh, SBO, here today for abd pain x2 hrs. Exam w uncomfortable-appearing m, markedly distended abdomen w diffuse tenderness. Likely SBO vs ileus. Plan for labs, CT, pain ctrl and reeval 79m w hx HTN, factor XII deficiency, DVT/PE on Xarelto, open flaco and ventral hernia s/p repair w mesh, SBO, here today for abd pain x2 hrs. Exam w uncomfortable-appearing m, markedly distended abdomen w diffuse tenderness. Likely SBO vs ileus. Plan for labs, CT, pain ctrl and reeval    CHARMAINE Davenport MD: Agree with resident statement above. Concern for possible SBO or other acute intraabd pathology given hpi and exam. Plan: basic labs, CTAP, sx control, ivf, reassess

## 2019-12-27 NOTE — H&P ADULT - HISTORY OF PRESENT ILLNESS
79M PMH Factor XII deficiency, DVT/PE (s/p IVC filter, on Xarelto), HTN, TREVER, BPH, Hx open cholecystectomy, ventral wall hernia repair with mesh (many years ago), presenting with abdominal pain. Of note, patient was recently admitted (11/2019) with similar symptoms, found on imaging at that time to have SBO. Patient was managed conservatively and obstruction resolved. Patient had been feeling well in regular state of health until this morning, when abdominal pain returned. The abdominal pain was severe, located across the upper abdomen. Last BM was yesterday, which was normal. Last flatus was yesterday, which patient states is unusual for him as he is very flatulent at baseline. Endorses some nausea without emesis. At time of exam, pt states pain has mostly dissipated.     Upon arrival to [facility] ED, AVSS. Normotensive. Afebrile. WBC 16.67. . Lactate 2.4. Pt underwent CT abd/pelvis, which demonstrated complete small bowel obstruction secondary to a left lower ventral abdominal wall hernia. No bowel wall thickening, mesenteric edema, ascites or pneumoperitoneum.

## 2019-12-27 NOTE — ED PROVIDER NOTE - CCCP TRG CHIEF CMPLNT
AS per mother, at 10:50 today her daughter bit one of her Cymbalta 6omg capsules can broke the open in her mouth. The mother got out as much of the contents of the capsule out of her daughters mouth asa she could. Her daughter has been acting normal since the incident.
abdominal pain

## 2019-12-27 NOTE — H&P ADULT - NSHPLABSRESULTS_GEN_ALL_CORE
9.5    16.67 )-----------( 290      ( 27 Dec 2019 05:02 )             35.6     12-27    142  |  106  |  32<H>  ----------------------------<  146<H>  4.0   |  23  |  0.91    Ca    10.6<H>      27 Dec 2019 05:02    TPro  7.7  /  Alb  3.9  /  TBili  0.4  /  DBili  x   /  AST  27  /  ALT  23  /  AlkPhos  177<H>  12-27      CT Abdomen and Pelvis w/ IV Cont (12.27.19 @ 06:48)    PROCEDURE DATE:  12/27/2019            INTERPRETATION:  CLINICAL INFORMATION: Abdominal distention.    COMPARISON: CT abdomen pelvis 11/11/2019.    TECHNIQUE: Contrast enhanced CT of the abdomen and pelvis was performed with coronal and sagittal reformats. 100 cc Omnipaque 350 were administered intravenously and 0 cc were discarded. No oral contrast.     FINDINGS:    LOWER CHEST:Subsegmental atelectasis in the lingula and left lower lobe. Calcifications of the aortic valve and coronary arteries. Fluid distended esophagus.    HEPATOBILIARY: Cholecystectomy. Liver and bile ducts within normal limits.  PANCREAS: Atrophic.  SPLEEN: Within normal limits.  ADRENALS: Within normal limits.    KIDNEYS/URETERS/BLADDER: Symmetric nephrographic phase of renal enhancement. Bilateral renal cysts and hypodense lesions not well characterize. No hydronephrosis. Bladder within normal limits.  REPRODUCTIVE ORGANS: Within normal limits.    BOWEL/PERITONEUM: Obstructing left lower ventral abdominal wall hernia with dilated, gas and fluid-filled proximal small bowel, stomach and esophagus. No bowel wall thickening, mesenteric edema or ascites. No pneumoperitoneum. The distal small bowel and colon are collapsed. Normal appendix. Colonic diverticulosis. Portions of the gastrointestinal tract are collapsed, limiting evaluation.     VESSELS:  Moderate calcific atherosclerosis. Infrarenal inferior vena cava filter. No abdominal aortic aneurysm.  LYMPHATICS/RETROPERITONEUM: No lymphadenopathy. No retroperitoneal hematoma.      SOFT TISSUES: Postsurgical changes of the ventral abdominal wall. Ventral abdominal wall hernia mesh. Rectus diastasis. Partially imaged large left inguinal hernia containing sigmoid colon.  BONES: Multilevel spinal degenerative changes.    IMPRESSION: Complete small bowel obstruction secondary to a left lower ventral abdominal wall hernia. No bowel wall thickening, mesenteric edema, ascites or pneumoperitoneum.

## 2019-12-27 NOTE — ED PROVIDER NOTE - PROGRESS NOTE DETAILS
Grant PGY3: received call from radiology pt with complete SBO at point of L ventral hernia repair surgery consulted will see pt on assessment pt in NAD will place ngt Grant PGY3: ngt placed pt accepted to surgical service will admit

## 2019-12-27 NOTE — H&P ADULT - NSHPPHYSICALEXAM_GEN_ALL_CORE
Gen: Laying in bed, in NAD  Resp: Nonlabored  CV: RRR  Abd: Soft, obese. Distended. Minimally TTP over left hemiabdomen lateral to midline. +Palpable bowel loop. No overlying skin changes. Well healed surgical incisions.   Ext: No C/C/E

## 2019-12-28 NOTE — PROGRESS NOTE ADULT - SUBJECTIVE AND OBJECTIVE BOX
Mr. Valladares is comfortable in bed  Denies any nausea or vomiting  States abdominal pain and distension have significantly improved  States he passing a lot of flatus    On exam: he is awake, alert and oriented  He is breathing comfortably on room air  He is moving his extremities  His abdomen is soft, minimally distended and NOT tender. Hernia still reduced.    79 year old man with SBO, now passing flatus  1. NGT clamp trial  2. PT evaluation

## 2019-12-28 NOTE — PROGRESS NOTE ADULT - SUBJECTIVE AND OBJECTIVE BOX
GENERAL SURGERY PROGRESS NOTE    SUBJECTIVE  Patient seen and examined. No acute events overnight. NPO NGT IVF without nausea, vomiting, passing large amounts of flatus per patient, voiding without issues, have been ambulating and out of bed. Denies fever, chills, SOB, chest pain.         OBJECTIVE    PHYSICAL EXAM  General: Appears well, NAD, NGT  CHEST: breathing comfortably  CV: appears well perfused  Abdomen: soft, nontender, nondistended, no rebound or guarding  Extremities: Grossly symmetric    T(C): 36.8 (12-28-19 @ 09:25), Max: 37.4 (12-28-19 @ 00:19)  HR: 65 (12-28-19 @ 09:25) (65 - 91)  BP: 114/65 (12-28-19 @ 09:25) (93/54 - 147/78)  RR: 18 (12-28-19 @ 09:25) (18 - 18)  SpO2: 98% (12-28-19 @ 09:25) (93% - 98%)    12-27-19 @ 07:01  -  12-28-19 @ 07:00  --------------------------------------------------------  IN: 3200 mL / OUT: 2100 mL / NET: 1100 mL    12-28-19 @ 07:01  -  12-28-19 @ 10:34  --------------------------------------------------------  IN: 0 mL / OUT: 175 mL / NET: -175 mL        MEDICATIONS  dextrose 5% + sodium chloride 0.45% with potassium chloride 20 mEq/L 1000 milliLiter(s) IV Continuous <Continuous>  enoxaparin Injectable 40 milliGRAM(s) SubCutaneous daily  influenza   Vaccine 0.5 milliLiter(s) IntraMuscular once  magnesium sulfate  IVPB 2 Gram(s) IV Intermittent once  pantoprazole  Injectable 40 milliGRAM(s) IV Push daily  potassium chloride  10 mEq/100 mL IVPB 10 milliEquivalent(s) IV Intermittent every 1 hour  tamsulosin 0.4 milliGRAM(s) Oral daily      LABS                        7.4    8.26  )-----------( 233      ( 28 Dec 2019 07:53 )             See note    12-28    144  |  109<H>  |  22  ----------------------------<  90  3.6   |  24  |  0.81    Ca    9.1      28 Dec 2019 06:18  Phos  2.3     12-28  Mg     1.8     12-28    TPro  7.7  /  Alb  3.9  /  TBili  0.4  /  DBili  x   /  AST  27  /  ALT  23  /  AlkPhos  177<H>  12-27    PT/INR - ( 27 Dec 2019 05:02 )   PT: 14.5 sec;   INR: 1.25 ratio         PTT - ( 27 Dec 2019 05:02 )  PTT:176.0 sec      RADIOLOGY & ADDITIONAL STUDIES

## 2019-12-28 NOTE — PROGRESS NOTE ADULT - ASSESSMENT
79M PMH Factor XII deficiency, DVT/PE (s/p IVC filter, on Xarelto), HTN, TREVER, BPH, Hx open cholecystectomy, ventral wall hernia repair with mesh (many years ago), presenting with abdominal pain, found on imaging to have SBO with transition point in ventral wall hernia.    - NPO/IVF  - NGT clamp trial this AM  - [place] out if patient passes clamp trial  - appreciate heme recs  - Discussed with Dr. Deniz Jamison and Dr. Janusz Lee  - monitor vitals, i/o's    Red surgery  [id number]

## 2019-12-28 NOTE — PROVIDER CONTACT NOTE (OTHER) - ACTION/TREATMENT ORDERED:
MD to bedside to push NGT back in place, awaiting xray to confirm placement. Will continue to monitor.

## 2019-12-28 NOTE — PROVIDER CONTACT NOTE (OTHER) - ASSESSMENT
pt pulled NGT out of place during sleep on accident. Pt found with NGT still in nose, but out approx 6 inches. Suction turned off.

## 2019-12-29 NOTE — PROGRESS NOTE ADULT - ASSESSMENT
Assessment	  79M PMH Factor XII deficiency, DVT/PE (s/p IVC filter, on Xarelto), HTN, TREVER, BPH, Hx open cholecystectomy, ventral wall hernia repair with mesh (many years ago), presenting with abdominal pain, found on imaging to have SBO with transition point in ventral wall hernia.    - advance diet as tolerated and IV lock   - appreciate heme recs  - monitor vitals, i/o's  - dispo planning     Red surgery  [id number]

## 2019-12-29 NOTE — DISCHARGE NOTE PROVIDER - NSDCCPCAREPLAN_GEN_ALL_CORE_FT
PRINCIPAL DISCHARGE DIAGNOSIS  Diagnosis: SBO (small bowel obstruction)  Assessment and Plan of Treatment:       SECONDARY DISCHARGE DIAGNOSES  Diagnosis: Abdominal wall hernia  Assessment and Plan of Treatment:

## 2019-12-29 NOTE — DISCHARGE NOTE PROVIDER - NSDCACTIVITY_GEN_ALL_CORE
Functional Deficits. Long Term Goals: To be achieved in: 6 weeks  1. Disability index score of 12% or less for the LEFS to assist with reaching prior level of function. 2. Patient will demonstrate decreased (no more than 2/10) pain/stiffness upon rising after prolonged sitting for improved function. 3. Patient will return to functional activities including kneeling/squatting without increased symptoms or restriction. 4. Pt will demonstrate ability to walk . 5 miles without increased pain/stiffness. Patient stated goal: To be able to play with son without pain     Therapist goals for Patient:   Short Term Goals: To be achieved in: 2 weeks  1. Independent in HEP and progression per patient tolerance, in order to prevent re-injury. 2. Patient will have a decrease in pain to facilitate improvement in movement, function, and ADLs as indicated by Functional Deficits.     Long Term Goals: To be achieved in: 6 weeks  1. Disability index score of 8% or less for the UEFI or Quick DASH to assist with reaching prior level of function. 2. Patient will demonstrate an increase in Strength in flexion, abduction, ER, and IR to 4+/5 or higher to allow for proper functional mobility as indicated by patients Functional Deficits. 3. Patient will return to functional activities including driving, lifting objects without increased symptoms or restriction. 4. Patient will demonstrate ability to play (carrying, lifting, putting him to bed) with son with no increase in pain        Progression Towards Functional goals:  [] Patient is progressing as expected towards functional goals listed. [] Progression is slowed due to complexities listed. [] Progression has been slowed due to co-morbidities.   [x] Plan just implemented, too soon to assess goals progression  [] Other:     Persisting Functional No restrictions

## 2019-12-29 NOTE — PROGRESS NOTE ADULT - SUBJECTIVE AND OBJECTIVE BOX
Surgery Progress Note  Patient is a 79y old  Male who presents with a chief complaint of SBO (28 Dec 2019 10:34)      SUBJECTIVE: Patient seen and examined at bedside with surgical team.   Passed TOV. NGT removed yesterday and patient was advanced to CLD  Tolerating CLD.   Passing flatus, no BMs.     Vital Signs Last 24 Hrs  T(C): 37.2 (29 Dec 2019 05:54), Max: 37.2 (29 Dec 2019 05:54)  T(F): 98.9 (29 Dec 2019 05:54), Max: 98.9 (29 Dec 2019 05:54)  HR: 75 (29 Dec 2019 05:54) (65 - 75)  BP: 98/60 (29 Dec 2019 05:54) (98/56 - 114/65)  BP(mean): --  RR: 18 (29 Dec 2019 05:54) (18 - 18)  SpO2: 93% (29 Dec 2019 05:54) (92% - 98%)    Physical Exam  Constitutional: NAD  Respiratory: breathing comfortably on RA  Abd: soft, NT, ND, No overlying skin changes. Well healed surgical incisions.   Ext: moving all 4 ext spontaneously     I&O's Detail    27 Dec 2019 07:01  -  28 Dec 2019 07:00  --------------------------------------------------------  IN:    lactated ringers.: 3000 mL    Solution: 200 mL  Total IN: 3200 mL    OUT:    Indwelling Catheter - Urethral: 1200 mL    Nasoenteral Tube: 450 mL    Voided: 450 mL  Total OUT: 2100 mL    Total NET: 1100 mL      28 Dec 2019 07:01  -  29 Dec 2019 06:58  --------------------------------------------------------  IN:    dextrose 5% + sodium chloride 0.45% with potassium chloride 20 mEq/L: 2760 mL    Oral Fluid: 660 mL    Solution: 500 mL    Solution: 50 mL    Solution: 300 mL  Total IN: 4270 mL    OUT:    Indwelling Catheter - Urethral: 550 mL    Nasoenteral Tube: 25 mL    Voided: 750 mL  Total OUT: 1325 mL    Total NET: 2945 mL      MEDICATIONS  (STANDING):  dextrose 5% + sodium chloride 0.45% with potassium chloride 20 mEq/L 1000 milliLiter(s) (70 mL/Hr) IV Continuous <Continuous>  enoxaparin Injectable 40 milliGRAM(s) SubCutaneous daily  influenza   Vaccine 0.5 milliLiter(s) IntraMuscular once  pantoprazole  Injectable 40 milliGRAM(s) IV Push daily  tamsulosin 0.4 milliGRAM(s) Oral daily    MEDICATIONS  (PRN):      LABS:                        8.1    9.88  )-----------( 232      ( 28 Dec 2019 14:59 )             29.9     12-28    144  |  109<H>  |  22  ----------------------------<  90  3.6   |  24  |  0.81    Ca    9.1      28 Dec 2019 06:18  Phos  2.3     12-28  Mg     1.8     12-28

## 2019-12-29 NOTE — DISCHARGE NOTE PROVIDER - CARE PROVIDER_API CALL
Deniz Jamison)  Surgery  [address] [facility] [address]  [place] [address]  Phone: [phone]  Fax: [phone]  Follow Up Time: 2 weeks

## 2019-12-29 NOTE — DISCHARGE NOTE PROVIDER - HOSPITAL COURSE
History of Present Illness:     79M PMH Factor XII deficiency, DVT/PE (s/p IVC filter, on Xarelto), HTN, TREVER, BPH, Hx open cholecystectomy, ventral wall hernia repair with mesh (many years ago), presenting with abdominal pain. Of note, patient was recently admitted (11/2019) with similar symptoms, found on imaging at that time to have SBO. Patient was managed conservatively and obstruction resolved. Patient had been feeling well in regular state of health until this morning, when abdominal pain returned. The abdominal pain was severe, located across the upper abdomen. Last BM was yesterday, which was normal. Last flatus was yesterday, which patient states is unusual for him as he is very flatulent at baseline. Endorses some nausea without emesis. At time of exam, pt states pain has mostly dissipated.         Upon arrival to [facility] ED, AVSS. Normotensive. Afebrile. WBC 16.67. . Lactate 2.4. Pt underwent CT abd/pelvis, which demonstrated complete small bowel obstruction secondary to a left lower ventral abdominal wall hernia. No bowel wall thickening, mesenteric edema, ascites or pneumoperitoneum.        On the day of admission patient was passing gas, without a bowel movement. NGT was in place and functioning .         HD1: NGT was clamped and removed. Patient continued to pass gas        HD2: Had a BM and passing gas.  Advanced to regular diet. Xarelto restarted         At the time of discharge, the patient was hemodynamically stable, was tolerating PO diet, was voiding urine and passing stool, was ambulating, and was comfortable with adequate pain control. The patient was instructed to follow up with Dr. Jamison within 2 weeks after discharge from the hospital. The patient/family felt comfortable with discharge. The patient was discharged to home. The patient had no other issues.

## 2019-12-29 NOTE — PHYSICAL THERAPY INITIAL EVALUATION ADULT - PRECAUTIONS/LIMITATIONS, REHAB EVAL
CT abd/pelvis demonstrated complete small bowel obstruction secondary to a left lower ventral abdominal wall hernia. CT abd/pelvis demonstrated complete small bowel obstruction secondary to a left lower ventral abdominal wall hernia./fall precautions

## 2019-12-29 NOTE — DISCHARGE NOTE PROVIDER - NSDCFUADDAPPT_GEN_ALL_CORE_FT
Please follow-up with Dr. Jamison within 2 weeks of discharge.     Please follow-up with your primary care provider regarding your hospitalization within one week of discharge.

## 2019-12-29 NOTE — PHYSICAL THERAPY INITIAL EVALUATION ADULT - ADDITIONAL COMMENTS
Prior to admission pt reports being independent of all ADL's & functional mobility using RW intermittently if needed. Pt resides in apt with spouse, ramp entrance, and stair lift to bedroom. Pt owns hospital bed. (+) driving short distances Prior to admission pt reports being independent of all ADL's & functional mobility using RW intermittently if needed. Pt resides in apt with spouse and granddaughter, ramp entrance, and stair lift to bedroom. Pt owns hospital bed. (+) driving short distances

## 2019-12-29 NOTE — PHYSICAL THERAPY INITIAL EVALUATION ADULT - PERTINENT HX OF CURRENT PROBLEM, REHAB EVAL
80 yo M PMH Factor XII deficiency, DVT/PE (s/p IVC filter, on Xarelto), HTN, TREVER, BPH, Hx open cholecystectomy, ventral wall hernia repair with mesh (many years ago), presenting with abdominal pain. Of note, patient was recently admitted (11/2019) with similar symptoms, found on imaging at that time to have SBO. Pt was managed conservatively and obstruction resolved. Patient had been feeling well in regular state of health until this morning, when abdominal pain returned.
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
## 2019-12-30 NOTE — DISCHARGE NOTE NURSING/CASE MANAGEMENT/SOCIAL WORK - PATIENT PORTAL LINK FT
You can access the FollowMyHealth Patient Portal offered by [facility] by registering at the following website: [email or web address]. By joining [employer]’s FollowMyHealth portal, you will also be able to view your health information using other applications (apps) compatible with our system.

## 2019-12-30 NOTE — CHART NOTE - NSCHARTNOTEFT_GEN_A_CORE
Hematology was consulted on this patient for his history of Factor XII deficiency and intra-op management for SBO, however patient ended up being managed conservatively.   Please see initial consult note for full recommendations. Patient can resume Xarelto if pt is not planned for any further procedures.    At this time we will sign out from the case.    Please call if any questions or concerns.     Odalis Morin MD  Hematology Oncology Fellow, PGY-4  [facility] Pager: [phone]/ [facility] Pager: [phone] Hematology was consulted on this patient for his history of Factor XII deficiency and intra-op management for SBO, however patient ended up being managed conservatively.   Please see initial consult note for full recommendations. Patient can resume Xarelto if pt is not planned for any further procedures.    At this time we will sign off the case.    Please call if any questions or concerns.     Odalis Morin MD  Hematology Oncology Fellow, PGY-4  [facility] Pager: [phone]/ [facility] Pager: [phone]

## 2019-12-30 NOTE — PROGRESS NOTE ADULT - ASSESSMENT
Assessment	  79M PMH Factor XII deficiency, DVT/PE (s/p IVC filter, on Xarelto), HTN, TREVER, BPH, Hx open cholecystectomy, ventral wall hernia repair with mesh (many years ago), presenting with abdominal pain, found on imaging to have SBO with transition point in ventral wall hernia.    - reg diet  - appreciate heme recs  - monitor vitals, i/o's  - pending AM labs results, plan to d/c home today, 24hour notice    Red surgery  [id number]

## 2020-01-01 ENCOUNTER — TRANSCRIPTION ENCOUNTER (OUTPATIENT)
Age: 80
End: 2020-01-01

## 2020-01-01 ENCOUNTER — APPOINTMENT (OUTPATIENT)
Dept: HOME HEALTH SERVICES | Facility: HOME HEALTH | Age: 80
End: 2020-01-01
Payer: COMMERCIAL

## 2020-01-01 ENCOUNTER — LABORATORY RESULT (OUTPATIENT)
Age: 80
End: 2020-01-01

## 2020-01-01 ENCOUNTER — APPOINTMENT (OUTPATIENT)
Dept: HOME HEALTH SERVICES | Facility: HOME HEALTH | Age: 80
End: 2020-01-01

## 2020-01-01 ENCOUNTER — EMERGENCY (EMERGENCY)
Facility: HOSPITAL | Age: 80
LOS: 1 days | Discharge: HOME | End: 2020-01-01
Attending: STUDENT IN AN ORGANIZED HEALTH CARE EDUCATION/TRAINING PROGRAM
Payer: COMMERCIAL

## 2020-01-01 VITALS
OXYGEN SATURATION: 100 % | RESPIRATION RATE: 18 BRPM | TEMPERATURE: 98 F | HEART RATE: 80 BPM | DIASTOLIC BLOOD PRESSURE: 74 MMHG | SYSTOLIC BLOOD PRESSURE: 122 MMHG

## 2020-01-01 VITALS
TEMPERATURE: 98.2 F | DIASTOLIC BLOOD PRESSURE: 80 MMHG | RESPIRATION RATE: 17 BRPM | SYSTOLIC BLOOD PRESSURE: 106 MMHG | OXYGEN SATURATION: 97 % | HEART RATE: 76 BPM

## 2020-01-01 VITALS
TEMPERATURE: 96 F | HEIGHT: 67 IN | SYSTOLIC BLOOD PRESSURE: 117 MMHG | WEIGHT: 220.02 LBS | OXYGEN SATURATION: 100 % | DIASTOLIC BLOOD PRESSURE: 77 MMHG | RESPIRATION RATE: 20 BRPM | HEART RATE: 77 BPM

## 2020-01-01 VITALS
OXYGEN SATURATION: 97 % | TEMPERATURE: 97.3 F | RESPIRATION RATE: 17 BRPM | SYSTOLIC BLOOD PRESSURE: 102 MMHG | HEART RATE: 82 BPM | DIASTOLIC BLOOD PRESSURE: 65 MMHG

## 2020-01-01 DIAGNOSIS — R39.15 URGENCY OF URINATION: ICD-10-CM

## 2020-01-01 DIAGNOSIS — I10 ESSENTIAL (PRIMARY) HYPERTENSION: ICD-10-CM

## 2020-01-01 DIAGNOSIS — B36.9 SUPERFICIAL MYCOSIS, UNSPECIFIED: ICD-10-CM

## 2020-01-01 DIAGNOSIS — G31.84 MILD COGNITIVE IMPAIRMENT, SO STATED: ICD-10-CM

## 2020-01-01 DIAGNOSIS — N40.1 BENIGN PROSTATIC HYPERPLASIA WITH LOWER URINARY TRACT SYMPMS: ICD-10-CM

## 2020-01-01 DIAGNOSIS — F32.5 MAJOR DEPRESSIVE DISORDER, SINGLE EPISODE, IN FULL REMISSION: ICD-10-CM

## 2020-01-01 DIAGNOSIS — Z71.89 OTHER SPECIFIED COUNSELING: ICD-10-CM
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
## 2020-01-01 DIAGNOSIS — I26.99 OTHER PULMONARY EMBOLISM W/OUT ACUTE COR PULMONALE: ICD-10-CM

## 2020-01-01 DIAGNOSIS — E55.9 VITAMIN D DEFICIENCY, UNSPECIFIED: ICD-10-CM

## 2020-01-01 DIAGNOSIS — G47.33 OBSTRUCTIVE SLEEP APNEA (ADULT) (PEDIATRIC): ICD-10-CM

## 2020-01-01 DIAGNOSIS — K62.5 HEMORRHAGE OF ANUS AND RECTUM: ICD-10-CM

## 2020-01-01 DIAGNOSIS — D50.8 OTHER IRON DEFICIENCY ANEMIAS: ICD-10-CM

## 2020-01-01 DIAGNOSIS — D68.2 HEREDITARY DEFICIENCY OF OTHER CLOTTING FACTORS: ICD-10-CM

## 2020-01-01 DIAGNOSIS — R26.81 UNSTEADINESS ON FEET: ICD-10-CM

## 2020-01-01 DIAGNOSIS — Z23 ENCOUNTER FOR IMMUNIZATION: ICD-10-CM

## 2020-01-01 DIAGNOSIS — R39.81 FUNCTIONAL URINARY INCONTINENCE: ICD-10-CM

## 2020-01-01 DIAGNOSIS — Z87.19 PERSONAL HISTORY OF OTHER DISEASES OF THE DIGESTIVE SYSTEM: ICD-10-CM

## 2020-01-01 DIAGNOSIS — I82.409 ACUTE EMBOLISM AND THROMBOSIS OF UNSPECIFIED DEEP VEINS OF UNSPECIFIED LOWER EXTREMITY: ICD-10-CM

## 2020-01-01 DIAGNOSIS — Z51.5 ENCOUNTER FOR PALLIATIVE CARE: ICD-10-CM

## 2020-01-01 DIAGNOSIS — N39.0 URINARY TRACT INFECTION, SITE NOT SPECIFIED: ICD-10-CM

## 2020-01-01 DIAGNOSIS — R64 CACHEXIA: ICD-10-CM

## 2020-01-01 DIAGNOSIS — K64.1 SECOND DEGREE HEMORRHOIDS: ICD-10-CM

## 2020-01-01 DIAGNOSIS — D64.9 ANEMIA, UNSPECIFIED: ICD-10-CM

## 2020-01-01 DIAGNOSIS — J32.9 CHRONIC SINUSITIS, UNSPECIFIED: ICD-10-CM

## 2020-01-01 DIAGNOSIS — K43.2 INCISIONAL HERNIA W/OUT OBSTRUCTION OR GANGRENE: ICD-10-CM

## 2020-01-01 DIAGNOSIS — N13.8 BENIGN PROSTATIC HYPERPLASIA WITH LOWER URINARY TRACT SYMPMS: ICD-10-CM

## 2020-01-01 LAB
25(OH)D3 SERPL-MCNC: 14.3 NG/ML
ALBUMIN SERPL ELPH-MCNC: 3.7 G/DL
ALBUMIN SERPL ELPH-MCNC: 3.8 G/DL — SIGNIFICANT CHANGE UP (ref 3.3–5)
ALP BLD-CCNC: 147 U/L
ALP SERPL-CCNC: 150 U/L — HIGH (ref 40–120)
ALT FLD-CCNC: 11 U/L — SIGNIFICANT CHANGE UP (ref 10–45)
ALT SERPL-CCNC: 7 U/L
ANION GAP SERPL CALC-SCNC: 12 MMOL/L — SIGNIFICANT CHANGE UP (ref 5–17)
ANION GAP SERPL CALC-SCNC: 8 MMOL/L
APPEARANCE UR: ABNORMAL
APPEARANCE: CLEAR
APPEARANCE: CLEAR
APTT BLD: >200 SEC — CRITICAL HIGH (ref 27.5–35.5)
AST SERPL-CCNC: 13 U/L
AST SERPL-CCNC: 22 U/L — SIGNIFICANT CHANGE UP (ref 10–40)
BACTERIA # UR AUTO: ABNORMAL
BASOPHILS # BLD AUTO: 0 K/UL — SIGNIFICANT CHANGE UP (ref 0–0.2)
BASOPHILS # BLD AUTO: 0.04 K/UL
BASOPHILS NFR BLD AUTO: 0 % — SIGNIFICANT CHANGE UP (ref 0–2)
BASOPHILS NFR BLD AUTO: 0.4 %
BILIRUB SERPL-MCNC: 0.3 MG/DL
BILIRUB SERPL-MCNC: 0.5 MG/DL — SIGNIFICANT CHANGE UP (ref 0.2–1.2)
BILIRUB UR-MCNC: NEGATIVE — SIGNIFICANT CHANGE UP
BILIRUBIN URINE: NEGATIVE
BILIRUBIN URINE: NEGATIVE
BLOOD URINE: ABNORMAL
BLOOD URINE: NEGATIVE
BUN SERPL-MCNC: 24 MG/DL
BUN SERPL-MCNC: 34 MG/DL — HIGH (ref 7–23)
CALCIUM SERPL-MCNC: 10.5 MG/DL
CALCIUM SERPL-MCNC: 10.7 MG/DL — HIGH (ref 8.4–10.5)
CHLORIDE SERPL-SCNC: 106 MMOL/L — SIGNIFICANT CHANGE UP (ref 96–108)
CHLORIDE SERPL-SCNC: 110 MMOL/L
CO2 SERPL-SCNC: 24 MMOL/L — SIGNIFICANT CHANGE UP (ref 22–31)
CO2 SERPL-SCNC: 26 MMOL/L
COLOR SPEC: YELLOW — SIGNIFICANT CHANGE UP
COLOR: YELLOW
COLOR: YELLOW
CREAT SERPL-MCNC: 0.63 MG/DL
CREAT SERPL-MCNC: 0.85 MG/DL — SIGNIFICANT CHANGE UP (ref 0.5–1.3)
CULTURE RESULTS: SIGNIFICANT CHANGE UP
DIFF PNL FLD: ABNORMAL
EOSINOPHIL # BLD AUTO: 0.11 K/UL — SIGNIFICANT CHANGE UP (ref 0–0.5)
EOSINOPHIL # BLD AUTO: 0.29 K/UL
EOSINOPHIL NFR BLD AUTO: 0.9 % — SIGNIFICANT CHANGE UP (ref 0–6)
EOSINOPHIL NFR BLD AUTO: 3.1 %
EPI CELLS # UR: 1 /HPF — SIGNIFICANT CHANGE UP
FOLATE SERPL-MCNC: 11.1 NG/ML
GLUCOSE QUALITATIVE U: NEGATIVE
GLUCOSE QUALITATIVE U: NEGATIVE
GLUCOSE SERPL-MCNC: 107 MG/DL
GLUCOSE SERPL-MCNC: 108 MG/DL — HIGH (ref 70–99)
GLUCOSE UR QL: NEGATIVE — SIGNIFICANT CHANGE UP
HCT VFR BLD CALC: 35.9 %
HCT VFR BLD CALC: 35.9 % — LOW (ref 39–50)
HGB BLD-MCNC: 9.2 G/DL
HGB BLD-MCNC: 9.4 G/DL — LOW (ref 13–17)
HYALINE CASTS # UR AUTO: 1 /LPF — SIGNIFICANT CHANGE UP (ref 0–2)
IMM GRANULOCYTES NFR BLD AUTO: 0.3 %
INR BLD: 1.71 RATIO — HIGH (ref 0.88–1.16)
KETONES UR-MCNC: NEGATIVE — SIGNIFICANT CHANGE UP
KETONES URINE: NEGATIVE
KETONES URINE: NEGATIVE
LEUKOCYTE ESTERASE UR-ACNC: ABNORMAL
LEUKOCYTE ESTERASE URINE: ABNORMAL
LEUKOCYTE ESTERASE URINE: ABNORMAL
LYMPHOCYTES # BLD AUTO: 0.79 K/UL — LOW (ref 1–3.3)
LYMPHOCYTES # BLD AUTO: 1.94 K/UL
LYMPHOCYTES # BLD AUTO: 6.4 % — LOW (ref 13–44)
LYMPHOCYTES NFR BLD AUTO: 21 %
MAN DIFF?: NORMAL
MCHC RBC-ENTMCNC: 17.9 PG — LOW (ref 27–34)
MCHC RBC-ENTMCNC: 18.2 PG
MCHC RBC-ENTMCNC: 25.6 GM/DL
MCHC RBC-ENTMCNC: 26.2 GM/DL — LOW (ref 32–36)
MCV RBC AUTO: 68.5 FL — LOW (ref 80–100)
MCV RBC AUTO: 70.9 FL
MONOCYTES # BLD AUTO: 0.67 K/UL — SIGNIFICANT CHANGE UP (ref 0–0.9)
MONOCYTES # BLD AUTO: 0.7 K/UL
MONOCYTES NFR BLD AUTO: 5.5 % — SIGNIFICANT CHANGE UP (ref 2–14)
MONOCYTES NFR BLD AUTO: 7.6 %
NEUTROPHILS # BLD AUTO: 10.37 K/UL — HIGH (ref 1.8–7.4)
NEUTROPHILS # BLD AUTO: 6.22 K/UL
NEUTROPHILS NFR BLD AUTO: 67.6 %
NEUTROPHILS NFR BLD AUTO: 84.5 % — HIGH (ref 43–77)
NITRITE UR-MCNC: POSITIVE
NITRITE URINE: NEGATIVE
NITRITE URINE: POSITIVE
PH UR: 6 — SIGNIFICANT CHANGE UP (ref 5–8)
PH URINE: 6
PH URINE: 6.5
PLATELET # BLD AUTO: 265 K/UL — SIGNIFICANT CHANGE UP (ref 150–400)
PLATELET # BLD AUTO: 306 K/UL
POTASSIUM SERPL-MCNC: 4.6 MMOL/L — SIGNIFICANT CHANGE UP (ref 3.5–5.3)
POTASSIUM SERPL-SCNC: 3.9 MMOL/L
POTASSIUM SERPL-SCNC: 4.6 MMOL/L — SIGNIFICANT CHANGE UP (ref 3.5–5.3)
PROT SERPL-MCNC: 6 G/DL
PROT SERPL-MCNC: 7.1 G/DL — SIGNIFICANT CHANGE UP (ref 6–8.3)
PROT UR-MCNC: ABNORMAL
PROTEIN URINE: NEGATIVE
PROTEIN URINE: NEGATIVE
PROTHROM AB SERPL-ACNC: 19.2 SEC — HIGH (ref 10.6–13.6)
RBC # BLD: 5.06 M/UL
RBC # BLD: 5.24 M/UL — SIGNIFICANT CHANGE UP (ref 4.2–5.8)
RBC # FLD: 21.8 % — HIGH (ref 10.3–14.5)
RBC # FLD: 22.5 %
RBC CASTS # UR COMP ASSIST: 3 /HPF — SIGNIFICANT CHANGE UP (ref 0–4)
SARS-COV-2 RNA SPEC QL NAA+PROBE: SIGNIFICANT CHANGE UP
SODIUM SERPL-SCNC: 142 MMOL/L — SIGNIFICANT CHANGE UP (ref 135–145)
SODIUM SERPL-SCNC: 144 MMOL/L
SP GR SPEC: 1.03 — HIGH (ref 1.01–1.02)
SPECIFIC GRAVITY URINE: 1.02
SPECIFIC GRAVITY URINE: 1.03
SPECIMEN SOURCE: SIGNIFICANT CHANGE UP
TROPONIN T, HIGH SENSITIVITY RESULT: 39 NG/L — SIGNIFICANT CHANGE UP (ref 0–51)
TROPONIN T, HIGH SENSITIVITY RESULT: 39 NG/L — SIGNIFICANT CHANGE UP (ref 0–51)
TSH SERPL-ACNC: 0.84 UIU/ML
UROBILINOGEN FLD QL: ABNORMAL
UROBILINOGEN URINE: ABNORMAL
UROBILINOGEN URINE: NORMAL
VIT B12 SERPL-MCNC: 277 PG/ML
WBC # BLD: 12.27 K/UL — HIGH (ref 3.8–10.5)
WBC # FLD AUTO: 12.27 K/UL — HIGH (ref 3.8–10.5)
WBC # FLD AUTO: 9.22 K/UL
WBC UR QL: 33 /HPF — HIGH (ref 0–5)

## 2020-01-01 PROCEDURE — G0439: CPT

## 2020-01-01 PROCEDURE — 90662 IIV NO PRSV INCREASED AG IM: CPT

## 2020-01-01 PROCEDURE — 87086 URINE CULTURE/COLONY COUNT: CPT

## 2020-01-01 PROCEDURE — 99345 HOME/RES VST NEW HIGH MDM 75: CPT | Mod: 25,95

## 2020-01-01 PROCEDURE — 99349 HOME/RES VST EST MOD MDM 40: CPT | Mod: 25

## 2020-01-01 PROCEDURE — 72170 X-RAY EXAM OF PELVIS: CPT | Mod: 26

## 2020-01-01 PROCEDURE — G0506: CPT | Mod: 95

## 2020-01-01 PROCEDURE — 73564 X-RAY EXAM KNEE 4 OR MORE: CPT

## 2020-01-01 PROCEDURE — 90715 TDAP VACCINE 7 YRS/> IM: CPT

## 2020-01-01 PROCEDURE — 85027 COMPLETE CBC AUTOMATED: CPT

## 2020-01-01 PROCEDURE — 99284 EMERGENCY DEPT VISIT MOD MDM: CPT | Mod: 25

## 2020-01-01 PROCEDURE — 99349 HOME/RES VST EST MOD MDM 40: CPT | Mod: 95

## 2020-01-01 PROCEDURE — 96374 THER/PROPH/DIAG INJ IV PUSH: CPT | Mod: XU

## 2020-01-01 PROCEDURE — 70450 CT HEAD/BRAIN W/O DYE: CPT

## 2020-01-01 PROCEDURE — 71045 X-RAY EXAM CHEST 1 VIEW: CPT | Mod: 26

## 2020-01-01 PROCEDURE — 85730 THROMBOPLASTIN TIME PARTIAL: CPT

## 2020-01-01 PROCEDURE — 80053 COMPREHEN METABOLIC PANEL: CPT

## 2020-01-01 PROCEDURE — 99498 ADVNCD CARE PLAN ADDL 30 MIN: CPT | Mod: 95

## 2020-01-01 PROCEDURE — 73110 X-RAY EXAM OF WRIST: CPT

## 2020-01-01 PROCEDURE — 93010 ELECTROCARDIOGRAM REPORT: CPT | Mod: 59

## 2020-01-01 PROCEDURE — 84484 ASSAY OF TROPONIN QUANT: CPT

## 2020-01-01 PROCEDURE — 73140 X-RAY EXAM OF FINGER(S): CPT

## 2020-01-01 PROCEDURE — 73130 X-RAY EXAM OF HAND: CPT

## 2020-01-01 PROCEDURE — 99497 ADVNCD CARE PLAN 30 MIN: CPT | Mod: 95

## 2020-01-01 PROCEDURE — 70450 CT HEAD/BRAIN W/O DYE: CPT | Mod: 26

## 2020-01-01 PROCEDURE — 73562 X-RAY EXAM OF KNEE 3: CPT | Mod: 26,RT

## 2020-01-01 PROCEDURE — 93005 ELECTROCARDIOGRAM TRACING: CPT | Mod: XU

## 2020-01-01 PROCEDURE — 73562 X-RAY EXAM OF KNEE 3: CPT

## 2020-01-01 PROCEDURE — 12002 RPR S/N/AX/GEN/TRNK2.6-7.5CM: CPT

## 2020-01-01 PROCEDURE — 85610 PROTHROMBIN TIME: CPT

## 2020-01-01 PROCEDURE — G0008: CPT

## 2020-01-01 PROCEDURE — 71045 X-RAY EXAM CHEST 1 VIEW: CPT

## 2020-01-01 PROCEDURE — 90471 IMMUNIZATION ADMIN: CPT

## 2020-01-01 PROCEDURE — 73130 X-RAY EXAM OF HAND: CPT | Mod: 26,50

## 2020-01-01 PROCEDURE — 81001 URINALYSIS AUTO W/SCOPE: CPT

## 2020-01-01 PROCEDURE — 72125 CT NECK SPINE W/O DYE: CPT | Mod: 26

## 2020-01-01 PROCEDURE — 72170 X-RAY EXAM OF PELVIS: CPT

## 2020-01-01 PROCEDURE — 73110 X-RAY EXAM OF WRIST: CPT | Mod: 26,50

## 2020-01-01 PROCEDURE — 72125 CT NECK SPINE W/O DYE: CPT

## 2020-01-01 PROCEDURE — 99285 EMERGENCY DEPT VISIT HI MDM: CPT | Mod: 25

## 2020-01-01 RX ORDER — TAMSULOSIN HYDROCHLORIDE 0.4 MG/1
0.4 CAPSULE ORAL
Qty: 30 | Refills: 5 | Status: ACTIVE | COMMUNITY

## 2020-01-01 RX ORDER — TRIAMCINOLONE ACETONIDE 5 MG/G
0.5 OINTMENT TOPICAL 3 TIMES DAILY
Qty: 4 | Refills: 3 | Status: COMPLETED | COMMUNITY
Start: 2020-01-01 | End: 2020-01-01

## 2020-01-01 RX ORDER — RIVAROXABAN 20 MG/1
20 TABLET, FILM COATED ORAL
Qty: 30 | Refills: 5 | Status: COMPLETED | COMMUNITY
End: 2020-01-01

## 2020-01-01 RX ORDER — NYSTATIN 100000 [USP'U]/G
100000 CREAM TOPICAL 3 TIMES DAILY
Qty: 1 | Refills: 1 | Status: COMPLETED | COMMUNITY
Start: 2020-01-01 | End: 2020-01-01

## 2020-01-01 RX ORDER — POTASSIUM CHLORIDE 1500 MG/1
20 TABLET, EXTENDED RELEASE ORAL
Refills: 0 | Status: COMPLETED | COMMUNITY
End: 2020-01-01

## 2020-01-01 RX ORDER — LIDOCAINE/EPINEPHR/TETRACAINE 4-0.09-0.5
1 GEL WITH PREFILLED APPLICATOR (ML) TOPICAL ONCE
Refills: 0 | Status: COMPLETED | OUTPATIENT
Start: 2020-01-01 | End: 2020-01-01

## 2020-01-01 RX ORDER — BUPROPION HYDROCHLORIDE 150 MG/1
150 TABLET, FILM COATED, EXTENDED RELEASE ORAL DAILY
Refills: 0 | Status: ACTIVE | COMMUNITY

## 2020-01-01 RX ORDER — LIDOCAINE HYDROCHLORIDE AND EPINEPHRINE 10; 10 MG/ML; UG/ML
10 INJECTION, SOLUTION INFILTRATION; PERINEURAL ONCE
Refills: 0 | Status: COMPLETED | OUTPATIENT
Start: 2020-01-01 | End: 2020-01-01

## 2020-01-01 RX ORDER — TETANUS TOXOID, REDUCED DIPHTHERIA TOXOID AND ACELLULAR PERTUSSIS VACCINE, ADSORBED 5; 2.5; 8; 8; 2.5 [IU]/.5ML; [IU]/.5ML; UG/.5ML; UG/.5ML; UG/.5ML
0.5 SUSPENSION INTRAMUSCULAR ONCE
Refills: 0 | Status: COMPLETED | OUTPATIENT
Start: 2020-01-01 | End: 2020-01-01

## 2020-01-01 RX ORDER — FUROSEMIDE 40 MG/1
40 TABLET ORAL
Refills: 0 | Status: COMPLETED | COMMUNITY
End: 2020-01-01

## 2020-01-01 RX ORDER — MEMANTINE HYDROCHLORIDE 5 MG/1
5 TABLET, FILM COATED ORAL
Refills: 0 | Status: COMPLETED | COMMUNITY
End: 2020-01-01

## 2020-01-01 RX ORDER — IPRATROPIUM BROMIDE 21 UG/1
0.03 SPRAY NASAL TWICE DAILY
Refills: 0 | Status: COMPLETED | COMMUNITY
End: 2020-01-01

## 2020-01-01 RX ORDER — SULFAMETHOXAZOLE AND TRIMETHOPRIM 800; 160 MG/1; MG/1
800-160 TABLET ORAL
Qty: 14 | Refills: 0 | Status: COMPLETED | COMMUNITY
Start: 2020-01-01 | End: 2020-01-01

## 2020-01-01 RX ORDER — CEFTRIAXONE 500 MG/1
1000 INJECTION, POWDER, FOR SOLUTION INTRAMUSCULAR; INTRAVENOUS ONCE
Refills: 0 | Status: COMPLETED | OUTPATIENT
Start: 2020-01-01 | End: 2020-01-01

## 2020-01-01 RX ORDER — COLESTIPOL HYDROCHLORIDE 1 G/1
1 TABLET, FILM COATED ORAL DAILY
Refills: 0 | Status: COMPLETED | COMMUNITY
End: 2020-01-01

## 2020-01-01 RX ORDER — MEMANTINE HYDROCHLORIDE 5 MG/1
5 TABLET, FILM COATED ORAL
Qty: 90 | Refills: 1 | Status: COMPLETED | COMMUNITY
Start: 2020-01-01 | End: 2020-01-01

## 2020-01-01 RX ORDER — MELATONIN 3 MG
25 MCG TABLET ORAL DAILY
Qty: 90 | Refills: 3 | Status: COMPLETED | COMMUNITY
Start: 2020-01-01 | End: 2020-01-01

## 2020-01-01 RX ORDER — FERROUS SULFATE 325(65) MG
325 (65 FE) TABLET ORAL DAILY
Qty: 90 | Refills: 1 | Status: COMPLETED | COMMUNITY
Start: 2020-01-01 | End: 2020-01-01

## 2020-01-01 RX ORDER — CEFPODOXIME PROXETIL 100 MG
1 TABLET ORAL
Qty: 20 | Refills: 0
Start: 2020-01-01 | End: 2020-01-01

## 2020-01-01 RX ORDER — NYSTATIN 100000 1/G
100000 POWDER TOPICAL 3 TIMES DAILY
Qty: 1 | Refills: 3 | Status: ACTIVE | COMMUNITY
Start: 2020-01-01 | End: 1900-01-01

## 2020-01-01 RX ORDER — OMEPRAZOLE 20 MG/1
20 CAPSULE, DELAYED RELEASE ORAL
Qty: 30 | Refills: 10 | Status: COMPLETED | COMMUNITY
End: 2020-01-01

## 2020-01-01 RX ORDER — FINASTERIDE 5 MG/1
5 TABLET, FILM COATED ORAL DAILY
Qty: 90 | Refills: 3 | Status: ACTIVE | COMMUNITY

## 2020-01-01 RX ADMIN — TETANUS TOXOID, REDUCED DIPHTHERIA TOXOID AND ACELLULAR PERTUSSIS VACCINE, ADSORBED 0.5 MILLILITER(S): 5; 2.5; 8; 8; 2.5 SUSPENSION INTRAMUSCULAR at 17:06

## 2020-01-01 RX ADMIN — LIDOCAINE HYDROCHLORIDE AND EPINEPHRINE 10 MILLILITER(S): 10; 10 INJECTION, SOLUTION INFILTRATION; PERINEURAL at 19:30

## 2020-01-01 RX ADMIN — CEFTRIAXONE 100 MILLIGRAM(S): 500 INJECTION, POWDER, FOR SOLUTION INTRAMUSCULAR; INTRAVENOUS at 22:06

## 2020-01-01 RX ADMIN — Medication 1 APPLICATION(S): at 18:31

## 2020-07-02 NOTE — ED PROVIDER NOTE - CARE PLAN
Principal Discharge DX:	Scalp laceration, initial encounter  Secondary Diagnosis:	Fall, initial encounter  Secondary Diagnosis:	Urinary tract infection without hematuria, site unspecified

## 2020-07-02 NOTE — ED PROVIDER NOTE - PATIENT PORTAL LINK FT
You can access the FollowMyHealth Patient Portal offered by [facility] by registering at the following website: [email or web address]. By joining [employer]’s FollowMyHealth portal, you will also be able to view your health information using other applications (apps) compatible with our system.

## 2020-07-02 NOTE — ED PROVIDER NOTE - ATTENDING CONTRIBUTION TO CARE
I performed a history and physical exam of the patient and discussed their management with the resident.  I reviewed the resident's note and agree with the documented findings and plan of care except as noted below. My medical decision making and observations are as follows:    Demi Mena (DO): 80y M PMHx DVT (on Xarelto) presents to ED s/p fall and laceration to head. Pt unclear on cause of fall but denies LOC. Family was able to help pt back to feet. Pt is AAOx3 and only recording some mild pain to injury on head. Unknown last tetanus. Concern for intracranial hemorrhage, given head strike on Xarelto. Will check labs, CT, XR, provide wound care and reassess.

## 2020-07-02 NOTE — ED PROVIDER NOTE - PROGRESS NOTE DETAILS
Per son, patient has factor 12 deficiency and has been worked up for chronically elevated PTT without any findings.  Pt's son would like him to be discharged back home if at all possible.  Son is NP at OSH.  - Demi Mena DO Mena, Demi (DO): 80y M PMHx DVT (on Xarelto) presents to ED s/p fall and laceration to head. Pt unclear on cause of fall but denies LOC. Family was able to help pt back to feet. Pt is AAOx3 and only recording some mild pain to injury on head. Unknown last tetanus. Concern for intracranial hemorrhage, given head strike on Xarelto. Will check labs, CT, XR, provide wound care and reassess. Spoke with son to up date on results.  Son is aware that patient needs suture removal in 7 days.  Pt has UTI and we will send abx to pt's pharmacy.  Son aware that we are giving dose of abx here.   Pt is resting comfortably, no bleeding from wound.  Will give abx dose and trial of ambulation to see it patient can be discharged.  - Demi Mena, DO discussed with son  [phone] will dc and son feels comfortable taking care of patient and getting him to followup. Patient feels well, tolerating PO. Discussed lab and radiology findings with patient. Patient feels comfortable going home. Gave home care and follow up instructions. Discussed which symptoms to look out for and when to return to the ED for further evaluation. Patient given opportunity to ask questions about their medical condition and had all questions answered.    can call wife for updates, [phone], non emergent in progress discussed with son  [phone] will dc and son feels comfortable taking care of patient and getting him to followup. Patient feels well, tolerating PO, ambulating well with assistance (uses walker at baseline).  Discussed lab and radiology findings with patient. Patient feels comfortable going home. Gave home care and follow up instructions. Discussed which symptoms to look out for and when to return to the ED for further evaluation. Patient given opportunity to ask questions about their medical condition and had all questions answered.    can call wife for updates, [phone], non emergent in progress

## 2020-07-02 NOTE — ED PROVIDER NOTE - PHYSICAL EXAMINATION
PHYSICAL EXAM:  GENERAL: NAD, lying in bed comfortably  HEAD:  stellate lesion 5 cm x2 cm across L. forehead, no   EYES: EOMI, PERRLA, conjunctiva and sclera clear  ENT: Moist mucous membranes  NECK: Supple, No JVD  CHEST/LUNG: Clear to auscultation bilaterally; No rales, rhonchi, wheezing, or rubs. Unlabored respirations  HEART: Regular rate and rhythm; No murmurs, rubs, or gallops  ABDOMEN: Bowel sounds present; Soft, Nontender, Nondistended   EXTREMITIES:  2+ Peripheral Pulses, brisk capillary refill. No clubbing, cyanosis, or edema  NERVOUS SYSTEM:  Alert & Oriented X3, speech clear. No deficits   MSK: FROM all 4 extremities  SKIN: Mild abrasions across L. fingers, bruising across B/L legs

## 2020-07-02 NOTE — ED PROVIDER NOTE - CLINICAL SUMMARY MEDICAL DECISION MAKING FREE TEXT BOX
80 Y M On rivaroxaban presenting after fall. No LOC, AMS, Neurologic deficits, likely mechanical fall. In setting of fall on bloodthinning medications CT of Head and neck with x-ray chest and pelvis. CBC, CMP, Assess social support and fall risk at home.

## 2020-07-02 NOTE — ED PROVIDER NOTE - NSFOLLOWUPINSTRUCTIONS_ED_ALL_ED_FT
1. TAKE ALL MEDICATIONS AS DIRECTED.    2. FOR PAIN OR FEVER YOU CAN TAKE IBUPROFEN (MOTRIN, ADVIL) OR ACETAMINOPHEN (TYLENOL) AS NEEDED, AS DIRECTED ON PACKAGING.  3. FOLLOW UP WITH YOUR PRIMARY DOCTOR WITHIN 7 DAYS AS DIRECTED FOR SUTURE REMOVAL OF YOUR SCALP SUTURES  4. IF YOU HAD LABS OR IMAGING DONE, YOU WERE GIVEN COPIES OF ALL LABS AND/OR IMAGING RESULTS FROM YOUR ER VISIT--PLEASE TAKE THEM WITH YOU TO YOUR FOLLOW UP APPOINTMENTS.  5. RETURN TO THE ER FOR ANY WORSENING SYMPTOMS OR CONCERNS.    Closed Head Injury    A closed head injury is an injury to your head that may or may not involve a traumatic brain injury (TBI). Symptoms of TBI can be short or long lasting and include headache, dizziness, interference with memory or speech, fatigue, confusion, changes in sleep, mood changes, nausea, depression/anxiety, and dulling of senses. Make sure to obtain proper rest which includes getting plenty of sleep, avoiding excessive visual stimulation, and avoiding activities that may cause physical or mental stress. Avoid any situation where there is potential for another head injury, including sports.    SEEK IMMEDIATE MEDICAL CARE IF YOU HAVE ANY OF THE FOLLOWING SYMPTOMS: unusual drowsiness, vomiting, severe dizziness, seizures, lightheadedness, muscular weakness, different pupil sizes, visual changes, or clear or bloody discharge from your ears or nose.    Laceration     A laceration is a cut that goes through all of the layers of the skin and into the tissue that is right under the skin. Some lacerations heal on their own. Others need to be closed with skin adhesive strips, skin glue, stitches (sutures), or staples. Proper laceration care minimizes the risk of infection and helps the laceration to heal better.  If non-absorbable stitches or staples have been placed, they must be taken out within the time frame instructed by your healthcare provider.    SEEK IMMEDIATE MEDICAL CARE IF YOU HAVE ANY OF THE FOLLOWING SYMPTOMS: swelling around the wound, worsening pain, drainage from the wound, red streaking going away from your wound, inability to move finger or toe near the laceration, or discoloration of skin near the laceration.    Sutures, Staples, or Adhesive Wound Closure    Doctors use stitches (sutures), staples, and glue (skin adhesives) to hold your skin together while it heals (wound closure). What your doctor uses depends on your wound.     In most cases, your wound will be closed right away (primary skin closure). Sometimes it may be closed later so that it can be cleaned and then heal naturally (delayed wound closure).    What are the types of wound closure?    Adhesive glue     To use adhesive glue, your doctor: Holds the edges of the wound together, Paints the glue onto your skin. You may need more than one layer, Covers your wound with a bandage (dressing) after the glue is dry  Adhesive glue may be used for: Wounds that are not deep (superficial wounds), Wounds on the face, Children's wounds.  Adhesive glue is not used inside of wounds, or on wounds that are: Deep. Uneven. Bleeding.   Some benefits of adhesive glue are: It leaves nothing that needs to be removed, You do not need medicine to numb the area, You have less pain than with other types of closure, Adhesive strips   Adhesive strips are: Made of paper that is sticky (adhesive) and has many small holes it in (porous), Placed across your wound edges, like a normal bandage, Used to close very shallow wounds, Sometimes used with sutures to help improve closure.    Sutures    Sutures come in many different materials, strengths, and sizes. Some sutures break down as your wound heals (absorbable). Other sutures need to be removed (nonabsorbable).  To use sutures, your doctor: Sews your skin together with sutures and a needle. May use one long (continuous) stitch or separate stitches. Ties and cuts the sutures at the end.   Sutures can be used for all types of wounds, including under the skin. They can cause a skin reaction that can lead to infection.    Staples    Staples are often used to close surgical cuts (incisions). To use staples, your doctor: Holds the edges of your wound close together. Places a staple across the wound. Uses a tool to secure the staple to the skin. Repeats this with as many staples as needed.  Staples are faster to use than sutures, and they cause less reaction from your skin. Staples need to be removed using a tool that bends the staples away from your skin.    Follow these instructions at home:    Medicines     Take over-the-counter and prescription medicines only as told by your doctor.  If you were prescribed an antibiotic medicine, take it as told by your doctor. Do not stop taking the antibiotic even if you start to feel better    Wound care     Follow instructions from your doctor about how to take care of your wound and bandage.    Wash your hands with soap and water before and after touching your wound or bandage. If you cannot use soap and water, use hand .  Do not try to remove your wound closures unless your doctor tells you to do that. You may need a follow-up visit for your doctor to remove your closures.  Closures may stay in place for 2 weeks or longer.  Absorbable sutures may break down after a few days or weeks.  If adhesive strip edges start to loosen and curl up, you may trim the loose edges.  Do not pick at your wound. Picking can cause an infection.  Apply ointments or creams only as told by your doctor.    Check your wound every day for signs of infection. Check for:  Redness, swelling, or pain.  Fluid or blood.  Warmth.  Pus or a bad smell.    General instructions     You may take showers with soap and water.   Do not take baths, swim, or use a hot tub until your doctor approves.   Do not soak your wound in water.  Keep all follow-up visits as told by your doctor. This is important.    Contact a doctor if you have any of the following:  A fever.   Chills.   Redness, swelling, or pain around your wound.  Fluid or blood coming from your wound.  Pus or a bad smell coming from your wound.  Notice that your wound feels warm to the touch.  Notice that the edges of your wound start to separate after your sutures come out.  Notice that your wound becomes thick, raised, and darker in color after your sutures come out (scarring).    Summary    What your doctor uses to hold your skin together while it heals (wound closure) depends on your wound.  Your doctor may use stitches (sutures), staples, and glue (skin adhesives).  Do not try to remove your wound closures unless your doctor tells you to do that.  Do not soak your wound in water.  This information is not intended to replace advice given to you by your health care provider.   Make sure you discuss any questions you have with your health care provider.

## 2020-07-02 NOTE — ED ADULT NURSE REASSESSMENT NOTE - NS ED NURSE REASSESS COMMENT FT1
Laceration clean and dry, ace bandage fell off, laceration rewrapped. Pt appears well, in no current distress. Pt aware of plan of care. Safety and comfort measures provided, bed locked and in lowest position, side rails up for safety. Call bell within reach. Awaiting disposition

## 2020-07-02 NOTE — ED PROVIDER NOTE - OBJECTIVE STATEMENT
80 Y M On rivaroxaban presenting after fall to head.  Patient does not remember reason for fall, normally ambulates with cane, denies any LOC, states down for ~30 minutes before wife returned home. Denies any LOV, LOC, Headache, Vision change, neck pain, fever, chills, dysuria, cough. 80 Y M On rivaroxaban presenting after fall to head.  Patient states he was walking into the bathroom with his walker and lost his balance falling and hitting head on the bathroom floor. denies any LOC, states down for ~30 minutes before wife returned home  and she and son helped him to his feet. Denies any LOV, LOC, Headache, Vision change, neck pain, fever, chills, dysuria, cough.

## 2020-07-02 NOTE — ED PROVIDER NOTE - NS ED ROS FT
REVIEW OF SYSTEMS:    CONSTITUTIONAL: No weakness, fevers or chills  EYES/ENT: No visual changes;  No vertigo or throat pain   NECK: Neck stiffness  RESPIRATORY: No cough, wheezing, hemoptysis; No shortness of breath  CARDIOVASCULAR: No chest pain or palpitations  GASTROINTESTINAL: No abdominal or epigastric pain. No nausea, vomiting, or hematemesis; No diarrhea or constipation. No melena or hematochezia.  GENITOURINARY: No dysuria, frequency or hematuria  NEUROLOGICAL: No numbness or weakness  EXTREMITIES: Pain on Right knee  SKIN: No itching, rashes

## 2020-07-02 NOTE — ED ADULT NURSE REASSESSMENT NOTE - NS ED NURSE REASSESS COMMENT FT1
Report received from JC Vieira. Upon reassessment pt resting quietly on stretcher in no distress. MD Hortatet at bedside placing sutures to head lac. As per MD pt C-collar cleared, removed by MD. Safety and comfort measures provided, bed locked and in lowest position, side rails up for safety. Call bell within reach. Awaiting disposition.

## 2020-07-02 NOTE — ED ADULT NURSE REASSESSMENT NOTE - NS ED NURSE REASSESS COMMENT FT1
Pt urinated in bed during suture procedure, pt changed and provided with new underwear, new sheets and new gown. Red socks applied. Pt repositioned in bed for comfort. Pt not actively bleeding at this time, laceration covered and wrapped. Pt appears well, denies any complaints. Safety and comfort measures provided, bed locked and in lowest position, side rails up for safety. Call bell within reach. Awaiting reevaluation by MD at 10pm for disposition.

## 2020-07-02 NOTE — ED ADULT NURSE NOTE - OBJECTIVE STATEMENT
Pt is an 81 yo M BIBEMS PMH TREVER, BPH, HTN, factor XII deficiency p/w L frontal head lac s/p fall. Pt was walking with walker and states "the next thing I knew I was on the floor." Denies LOC, syncope, dizziness, SOB, cough, flu-like symptoms. Pt c/o neck pain and 3/10 pain localized to laceration area. Pt placed in C collar on arrival. A&Ox4, QUIJANO, lungs CTABL, distal pulses intact, abdomen soft nontender, skin has scattered bruising and laceration noted to L frontal area. Pt denies pain anywhere else. Side rails up for safety, call bell and personal items within reach, instructed to call for assistance, verbalizes understanding. Will continue to monitor. Pt is an 81 yo M BIBEMS PMH TREVER, BPH, HTN, factor XII deficiency p/w L frontal head lac s/p fall. Pt was walking with walker and states "the next thing I knew I was on the floor." Denies LOC, syncope, dizziness, SOB, cough, flu-like symptoms. Pt c/o neck pain and 3/10 pain localized to laceration area. Pt placed in C collar on arrival. A&Ox4, QUIJANO, lungs CTABL, distal pulses intact, abdomen soft nontender, skin has scattered bruising, bruising and deformity noted to L third finger and R fourth finger, skin tear to L upper arm, and laceration noted to L frontal area. Pt denies pain anywhere else. Side rails up for safety, call bell and personal items within reach, instructed to call for assistance, verbalizes understanding. Will continue to monitor. Pt is an 79 yo M BIBEMS PMH TREVER, BPH, HTN, factor XII deficiency p/w L frontal head lac s/p fall. Pt was walking with walker and states "the next thing I knew I was on the floor." Denies LOC, syncope, dizziness, SOB, cough, flu-like symptoms. Pt c/o neck pain and 3/10 pain localized to laceration area. Pt placed in C collar on arrival. A&Ox4, QUIJANO, lungs CTABL, distal pulses intact, abdomen soft nontender. Skin has scattered bruising, bruising and deformity noted to L third finger and R fourth finger, skin tear to L upper arm, bruising to R knee, and laceration noted to L frontal area. Pt denies pain anywhere else. Side rails up for safety, call bell and personal items within reach, instructed to call for assistance, verbalizes understanding. Will continue to monitor.

## 2020-07-05 NOTE — ED POST DISCHARGE NOTE - DETAILS
spoke to patients bridgett Jones, patient feeling improved, taking abx and will follow up with PMD - Chantelle Gunderson PA-C

## 2020-07-22 PROBLEM — K62.5 RECTAL BLEEDING: Status: ACTIVE | Noted: 2019-04-03

## 2020-07-22 PROBLEM — K43.2 VENTRAL HERNIA, RECURRENT: Status: RESOLVED | Noted: 2020-01-01 | Resolved: 2020-01-01

## 2020-07-22 PROBLEM — Z87.19 HISTORY OF SMALL BOWEL OBSTRUCTION: Status: RESOLVED | Noted: 2020-01-01 | Resolved: 2020-01-01

## 2020-07-22 PROBLEM — R39.81 FUNCTIONAL URINARY INCONTINENCE: Status: ACTIVE | Noted: 2020-01-01

## 2020-07-22 PROBLEM — K64.1 GRADE II HEMORRHOIDS: Status: RESOLVED | Noted: 2019-04-04 | Resolved: 2020-01-01

## 2020-07-22 NOTE — CHRONIC CARE ASSESSMENT
[Limited decision making ability] : limited decision making ability [Resistant to using DME in place] : resistant to using DME in place [Can not Exercise (Disability)] : Exercise: The patient can not exercise due to disability [None] : The patient does not exercise [Inadequate Caloric Intake] : inadequate in calories [Low Fat Diet] : low fat [General Adherence] : and is generally adherent [Noted Weight Loss] : noted weight loss [Needs Less Junk Food] : needs elimination of junk food [FAST Score: ____] : Functional Assessment Scale (FAST) Score: [unfilled]

## 2020-08-05 PROBLEM — D64.9 ANEMIA: Status: ACTIVE | Noted: 2020-01-01

## 2020-09-17 PROBLEM — D50.8 OTHER IRON DEFICIENCY ANEMIA: Status: ACTIVE | Noted: 2020-01-01

## 2020-09-17 PROBLEM — F32.5 MAJOR DEPRESSION IN REMISSION: Status: ACTIVE | Noted: 2020-01-01

## 2020-09-17 PROBLEM — N39.0 ACUTE UTI: Status: RESOLVED | Noted: 2020-01-01 | Resolved: 2020-01-01

## 2020-09-17 PROBLEM — N40.1 BPH WITH OBSTRUCTION/LOWER URINARY TRACT SYMPTOMS: Status: ACTIVE | Noted: 2020-01-01

## 2020-09-17 PROBLEM — G31.84 MILD COGNITIVE IMPAIRMENT WITH MEMORY LOSS: Status: ACTIVE | Noted: 2020-01-01

## 2020-09-17 PROBLEM — G47.33 OBSTRUCTIVE SLEEP APNEA: Status: ACTIVE | Noted: 2020-01-01

## 2020-09-17 PROBLEM — D68.2 FACTOR XII DEFICIENCY: Status: ACTIVE | Noted: 2020-01-01

## 2020-09-17 PROBLEM — Z23 NEEDS FLU SHOT: Status: ACTIVE | Noted: 2020-01-01

## 2020-09-17 PROBLEM — B36.9 DERMATITIS FUNGAL: Status: ACTIVE | Noted: 2020-01-01

## 2020-09-17 PROBLEM — J32.9 RECURRENT SINUSITIS: Status: ACTIVE | Noted: 2020-01-01

## 2020-09-17 PROBLEM — E55.9 VITAMIN D INSUFFICIENCY: Status: ACTIVE | Noted: 2020-01-01

## 2020-09-17 NOTE — COUNSELING
[Weight counseling provided] : weight counseling provided [Mediterranean diet recommended] : Mediterranean diet recommended [Non - Smoker] : non-smoker [Smoke/CO Detectors] : smoke/CO detectors [Use grab bars] : use grab bars [Medical alert] : medical alert [Use assistive device to avoid falls] : use assistive device to avoid falls [Remove clutter and unsafe carpeting to avoid falls] : remove clutter and unsafe carpeting to avoid falls [] : foot exam [Completed] : Aspirin use discussion completed [Improve mobility] : improve mobility [Improve exercise tolerance] : improve exercise tolerance [Improve pain control] : improve pain control [Improve weight] : improve weight [Decrease stress] : decrease stress [Decrease hospital use] : decrease hospital use [Minimize unnecessary interventions] : minimize unnecessary interventions [Comfort Care] : comfort care [Maintain functional ability] : maintain functional ability [Discussed disease trajectory with patient/caregiver] : discussed disease trajectory with patient/caregiver [Likely to achieve goals/desired outcomes] : likely to achieve goals/desired outcomes [Patient/Caregiver has ___ understanding of disease process] : patient/caregiver has [unfilled] understanding of disease process [Advanced Directives discussed: ____] : Advanced directives discussed: [unfilled] [ - New patient with 2 or more chronic conditions; CCM discussed and patient-centered care plan established] : New patient with 2 or more chronic conditions; CCM discussed and patient-centered care plan established [de-identified] : unclear of wishes

## 2020-09-17 NOTE — PHYSICAL EXAM
[No Acute Distress] : no acute distress [Normal Voice/Communication] : normal voice communication [Normal Oropharynx] : the oropharynx was normal [EOMI] : extra ocular movement intact [No JVD] : no jugular venous distention [Supple] : the neck was supple [No Respiratory Distress] : no respiratory distress [No Edema] : there was no peripheral edema [No Accessory Muscle Use] : no accessory muscle use [Non Tender] : non-tender [Soft] : abdomen soft [No Spinal Tenderness] : no spinal tenderness [Not Distended] : not distended [No Rash] : no rash [No Motor Deficits] : the motor exam was normal [No Gross Sensory Deficits] : no gross sensory deficits [de-identified] : cachectic with generalized muscle wasting, pleasant, flat affect [de-identified] : untable gait [de-identified] : flat affect, oriented to place, person, year and month, not to date or day of the week. minicog was 2/3

## 2020-09-17 NOTE — CHRONIC CARE ASSESSMENT
[Limited decision making ability] : limited decision making ability [Resistant to using DME in place] : resistant to using DME in place [Can not Exercise (Disability)] : Exercise: The patient can not exercise due to disability [None] : The patient does not exercise [Low Fat Diet] : low fat [General Adherence] : and is generally adherent [Inadequate Caloric Intake] : inadequate in calories [Needs Less Junk Food] : needs elimination of junk food [Noted Weight Loss] : noted weight loss [FAST Score: ____] : Functional Assessment Scale (FAST) Score: [unfilled]

## 2020-09-17 NOTE — REASON FOR VISIT
[Initial Annual Medicare Wellness Visit] : an initial annual Medicare wellness visit [Pre-Visit Preparation] : pre-visit preparation was done [Spouse] : spouse [Intercurrent Specialty/Sub-specialty Visits] : the patient has intercurrent specialty/sub-specialty visits [FreeTextEntry3] : SW CM

## 2020-09-17 NOTE — PHYSICAL EXAM
[No Acute Distress] : no acute distress [Normal Voice/Communication] : normal voice communication [EOMI] : extra ocular movement intact [Normal Oropharynx] : the oropharynx was normal [No JVD] : no jugular venous distention [Supple] : the neck was supple [No Respiratory Distress] : no respiratory distress [No Accessory Muscle Use] : no accessory muscle use [No Edema] : there was no peripheral edema [Non Tender] : non-tender [Soft] : abdomen soft [Not Distended] : not distended [No Spinal Tenderness] : no spinal tenderness [No Motor Deficits] : the motor exam was normal [No Gross Sensory Deficits] : no gross sensory deficits [Well Nourished] : well nourished [Well Developed] : well developed [PERRL] : pupils equal, round and reactive to light [Clear to Auscultation] : lungs were clear to auscultation bilaterally [Normal Rate] : heart rate was normal  [Regular Rhythm] : with a regular rhythm [Normal S1, S2] : normal S1 and S2 [Normal Bowel Sounds] : normal bowel sounds [Normal Affect] : the affect was normal [Normal Mood] : the mood was normal [Normal Insight/Judgement] : insight and judgment were intact [de-identified] : generalized muscle wasting, pleasant, interactive, skin color good [de-identified] : able to get up from chair without assisstance, used walker to take few steps, TUG- >1 min [de-identified] : erythematous rash left groin, no sign of infection [de-identified] : oriented to place, person, year and month, not to date or day of the week. recall was 2/3, able to interact and provide information on his medical codition

## 2020-09-17 NOTE — HISTORY OF PRESENT ILLNESS
[Patient] : patient [Spouse] : spouse [Family Member] : family member [FreeTextEntry1] : gait instability [FreeTextEntry2] : 81 yo M with hx MCI, COPD, TREVER on CPAP, diverticulosis, BPH and HTN, s/p nasal melanoma excision, with hx LE DVT and PE in 5/2013 on xarelto, hx sepsis 2/2 hemarthrosis R knee s/p fall followed by IVC filter placement, intermittent rectal bleeding due to hemorrhoids, sinusitis with chronic headache and cough, factor x11 deficiency, last hospital admission 12/2019 for SBP due to ventral hernia Mx conservatively, then ER visit 7/2/20 for a fall with laceration on scalp seen for follow-up and flu shot.\par \par patient has been doing good at baseline with no active complaints as per himself. spouse thinks he gets SOB when walks from living room to bed room, but patient denies any changes. he completed PT, and it has improved his walking overall. he states that his spouse gets very anxious easily about his medical condition. \par denies nausea, vomiting, fever, chills, SOB, urinary complaints.\par eats regular food, with suspected swallowing problems. sometimes can cough with tablets as per son\par reports  good appetite, very picky all his life, only wishes to eat 6 things, ribs are favorite. weight has been stable lately with progressive muscle loss, not sure how much did he lost\par BM is regular, goes multiple times a day due to poor sphincter control. last BM was this morning, does not take laxatives\par sleeps well\par memory is intact as per family, spouse wishes to restart memantine\par behavior is calm\par mood- has h/o depression, on bupropion\par skin is intact with inguinal fungal rash, nystatin powder is not helping as per spouse.\par \par rectal bleeding- continues to be intermittent, last colonoscopy was many years ago, does not wish to repeat it. hemorrhoids s/p sclerotherapy. son states that he holds xarelto when bleeding happens for a day or two followed by resumption. no need for transfusion in recent past. \par \par gait instability- patient stopped walking without assistance since 2013 after he was hospitalized for sepsis, since then it has progressively declined. he can take few steps with walker, and manage to go from living room to be bed room and vice versa, completed PT.\par \par sinus headaches- chronic, relieved by tylenol, no recent episodes as per patient.\par -----------\par lives with spouse who also works and is caregiver\par has private help for 1-2 hours few days a week

## 2020-09-17 NOTE — HISTORY OF PRESENT ILLNESS
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, hx sepsis 2/2 hemarthrosis R knee s/p fall followed by IVC filter placement, intermittent rectal bleeding due to hemorrhoids, sinusitis with chronic headache and cough, factor x11 deficiency, last hospital admission 12/2019 for SBP due to ventral hernia Mx conservatively, then ER visit 7/2/20 for a fall with laceration on scalp seen for initial enrollment.\par \par Patient is being seen for a visit provided via telehealth real-time audio visual technology.\par Patient was located at their home at the time of the visit.\par The [facility] clinician, Craig Vences, was located remotely at their home in [place] at the time of the visit. \par The patient and the [facility] clinician participated in the telehealth encounter.\par Other participants included: spouse\par \par Patient and his presentative consents to the use of telehealth. All questions related to telehealth answered\par \par patient has been doing ok at baseline with no active complaints as per himself, spouse and son Ki.\par he has not been walking well since 2013 after he was admitted for sepsis, it progressively declined over years resulting in bed bound status. he can take few steps with walker and personal assistance, however is very unstable on feet leading to recurrent falls. PT did not benefit him as per son.\par denies nausea, vomiting, fever, chills, SOB, urinary complaints.\par eats regular food, with suspected swallowing problems. sometimes can cough with tablets as per son\par reports  good appetite, very picky all his life, only wishes to eat 6 things, ribs are favorite. weight has been declining with muscle atrophy, not sure how much did he lost\par BM is regular, goes multiple times a day due to poor sphincter control. last BM was this morning, does not take laxatives\par sleeps well\par memory is intact as per family, was started on memantine by PCP few months ago which did not benefit\par behavior is calm\par mood- has h/o depression, on bupropion\par skin is intact with no issues\par \par rectal bleeding- continues to be intermittent, last colonoscopy was many years ago, does not wish to repeat it. hemorrhoids s/p sclerotherapy. son states that he holds xarelto when bleeding happens for a day or two followed by resumption. no need for transfusion in recent past. \par -----------\par lives with spouse who also works and is caregiver\par has private help for 1-2 hours few days a week\par \par \par
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
## 2020-10-07 PROBLEM — R39.15 URINARY URGENCY: Status: ACTIVE | Noted: 2020-01-01

## 2020-10-07 NOTE — PHYSICAL EXAM
[No Acute Distress] : no acute distress [Well Nourished] : well nourished [Well Developed] : well developed [Normal Voice/Communication] : normal voice communication [PERRL] : pupils equal, round and reactive to light [EOMI] : extra ocular movement intact [Normal Oropharynx] : the oropharynx was normal [No JVD] : no jugular venous distention [Supple] : the neck was supple [No Respiratory Distress] : no respiratory distress [Clear to Auscultation] : lungs were clear to auscultation bilaterally [No Accessory Muscle Use] : no accessory muscle use [Normal Rate] : heart rate was normal  [Regular Rhythm] : with a regular rhythm [Normal S1, S2] : normal S1 and S2 [No Edema] : there was no peripheral edema [Normal Bowel Sounds] : normal bowel sounds [Non Tender] : non-tender [Soft] : abdomen soft [Not Distended] : not distended [No Spinal Tenderness] : no spinal tenderness [No Motor Deficits] : the motor exam was normal [No Gross Sensory Deficits] : no gross sensory deficits [Normal Affect] : the affect was normal [Normal Mood] : the mood was normal [Normal Insight/Judgement] : insight and judgment were intact [de-identified] : generalized muscle wasting, looks weaker, still interactive, skin color good [de-identified] : walking with max assistance [de-identified] : erythematous rash left groin, no sign of infection [de-identified] : oriented to place, person,

## 2020-10-07 NOTE — COUNSELING
[Weight counseling provided] : weight counseling provided [Mediterranean diet recommended] : Mediterranean diet recommended [Non - Smoker] : non-smoker [Smoke/CO Detectors] : smoke/CO detectors [Use grab bars] : use grab bars [Medical alert] : medical alert [Use assistive device to avoid falls] : use assistive device to avoid falls [Remove clutter and unsafe carpeting to avoid falls] : remove clutter and unsafe carpeting to avoid falls [] : foot exam [Completed] : Aspirin use discussion completed [Improve exercise tolerance] : improve exercise tolerance [Improve mobility] : improve mobility [Improve weight] : improve weight [Improve pain control] : improve pain control [Decrease stress] : decrease stress [Decrease hospital use] : decrease hospital use [Minimize unnecessary interventions] : minimize unnecessary interventions [Comfort Care] : comfort care [Maintain functional ability] : maintain functional ability [Discussed disease trajectory with patient/caregiver] : discussed disease trajectory with patient/caregiver [Likely to achieve goals/desired outcomes] : likely to achieve goals/desired outcomes [Patient/Caregiver has ___ understanding of disease process] : patient/caregiver has [unfilled] understanding of disease process [Advanced Directives discussed: ____] : Advanced directives discussed: [unfilled] [de-identified] : unclear of wishes

## 2020-10-07 NOTE — REASON FOR VISIT
[Acute] : an acute visit [Spouse] : spouse [Pre-Visit Preparation] : pre-visit preparation was done [Intercurrent Specialty/Sub-specialty Visits] : the patient has intercurrent specialty/sub-specialty visits [FreeTextEntry3] : SW CM

## 2020-10-07 NOTE — HISTORY OF PRESENT ILLNESS
[Patient] : patient [Spouse] : spouse [Family Member] : family member [FreeTextEntry1] : gait instability [FreeTextEntry2] : 81 yo M with hx MCI, COPD, TREVER on CPAP, diverticulosis, BPH and HTN, s/p nasal melanoma excision, with hx LE DVT and PE in 5/2013 on xarelto, hx sepsis 2/2 hemarthrosis R knee s/p fall followed by IVC filter placement, intermittent rectal bleeding due to hemorrhoids, sinusitis with chronic headache and cough, factor x11 deficiency, last hospital admission 12/2019 for SBP due to ventral hernia Mx conservatively, then ER visit 7/2/20 for a fall with laceration on scalp seen for acute visit for fall and urinary infection.\par \par CHRISTOPHE BAILON is being seen for a visit provided via [employer] real-time audio visual technology.\par CHRISTOPHE BAILON was located at their home, [facility]\[place] [address], at the time of the visit.\par The [facility] clinician, CHARANJIT BARKLEY, was located remotely at their home in [place] at the time of the visit.\par The patient, CHRISTOPHE BAILON, and the [facility] clinician, CHARANJIT BARKLEY, participated in the telehealth encounter.\par Other participants included the MTT, who was in the home with the patient, performed vitals monitoring and physical exam, and facilitated the video visit with CHARANJIT BARKLEY. The assessment and plan was made on the basis of the information provided by the MTT.\par \par \par patient started having urinary urgency last week, holds urinal all the time, feels like urine is coming but nothing comes out. he is weaker than usual, fell yesterday s/p CP response for lift assist. appetite was worse yesterday, improved today. abx bactrim was started yesterday and spouse saw noticeable improvement since yesterday. \par denies nausea, vomiting, fever, chills, SOB, urinary complaints.\jostin eats regular food, with suspected swallowing problems. sometimes can cough with tablets as per son\par reports  good appetite, very picky all his life, only wishes to eat 6 things, ribs are favorite. weight has been stable lately with progressive muscle loss, not sure how much did he lost\par BM is regular, goes multiple times a day due to poor sphincter control. last BM was this morning, does not take laxatives\par sleeps well\par memory is intact as per family, spouse wishes to restart memantine\par behavior is calm\par mood- has h/o depression, on bupropion\par skin is intact with inguinal fungal rash, nystatin powder is not helping as per spouse.\par \par rectal bleeding- continues to be intermittent, last colonoscopy was many years ago, does not wish to repeat it. hemorrhoids s/p sclerotherapy. son states that he holds xarelto when bleeding happens for a day or two followed by resumption. no need for transfusion in recent past. \par \par gait instability- patient stopped walking without assistance since 2013 after he was hospitalized for sepsis, since then it has progressively declined. he can take few steps with walker, and manage to go from living room to be bed room and vice versa, completed PT.\par \par sinus headaches- chronic, relieved by tylenol, no recent episodes as per patient.\par -----------\par lives with spouse who also works and is caregiver\par has private help for 1-2 hours few days a week

## 2020-10-28 PROBLEM — Z51.5 HOSPICE CARE PATIENT: Status: ACTIVE | Noted: 2020-01-01

## 2020-10-28 PROBLEM — I10 BENIGN ESSENTIAL HYPERTENSION: Status: ACTIVE | Noted: 2020-01-01

## 2020-10-28 PROBLEM — Z71.89 ADVANCE CARE PLANNING: Status: ACTIVE | Noted: 2020-01-01

## 2020-10-28 PROBLEM — R64 CACHEXIA: Status: ACTIVE | Noted: 2020-01-01

## 2020-10-28 PROBLEM — R26.81 GAIT INSTABILITY: Status: ACTIVE | Noted: 2020-01-01

## 2020-11-23 ENCOUNTER — NON-APPOINTMENT (OUTPATIENT)
Age: 80
End: 2020-11-23

## 2021-02-03 NOTE — ED ADULT NURSE NOTE - CHIEF COMPLAINT QUOTE
Health Maintenance Due   Topic Date Due   • Shingles Vaccine (1 of 2) 09/20/2006   • Influenza Vaccine (1) 08/01/2020       Patient is due for topics as listed above but is not proceeding with Immunization(s) Influenza and Shingles at this time. Education provided for Immunization(s) Influenza and Shingles.         abd pain

## 2021-08-05 NOTE — PATIENT PROFILE ADULT - NSPRONUTRITIONRISK_GEN_A_NUR
08/05/21    Pili Confer   1970   [id number]     Assessment  1 Urinary frequency  2 Prostate cancer screening    Discussion/Plan  1 Urinary frequency   8/3/21 Urinalysis unremarkable   8/5/21 Urine dip in office: negative leukocytes, negative blood, dark pretty, odorous   PVR 50   Encouraged patient to reduce his intake of coffee and incorporate more water into his daily diet  48-64 oz daily recommended and avoidance of bladder irritants  Patient wishes to remain conservative and avoid medication at this time  2 Prostate cancer screening   3/10/21 PSA 0 5   ERNESTINE: defers to follow up with Dr Nathan William     Patient will adjust lifestyle and dietary choices for control of urinary frequency  He will follow up with Dr Nathan William for routine prostate cancer screening  All questions answered at this time  He will return if symptoms fail to improve  Subjective  HPI   Rachel Kwong is a 44year old male who presents in consultation to the office with complaints of urinary frequency x 2 days  He is previously known to Dr Nathan William  Urinalysis was negative for infection  He was referred by his PCP  He consumes large volume of coffee approximately 64 oz daily with 24 oz or less of water intake  He works night shift and reports consuming 6-7 cups of coffee at work  He denies gross hematuria, pain, dysuria, incomplete emptying, fever, chills  He has gained 20 lbs in the past year and is motivated to lose weight  No changes to overall health  He reports his symptoms have resolved since adjusting his coffee intake     PMH: hypothyroidism, BPH, hyperlipidemia, ED, DVT, obesity, testicular microlithiasis    Review of Systems - History obtained from chart review and the patient  General ROS: negative  Psychological ROS: negative  Ophthalmic ROS: negative  ENT ROS: negative  Hematological and Lymphatic ROS: negative  Endocrine ROS: negative  Breast ROS: negative for breast lumps  Respiratory ROS: no cough, shortness of breath, or wheezing  Cardiovascular ROS: no chest pain or dyspnea on exertion  Gastrointestinal ROS: no abdominal pain, change in bowel habits, or black or bloody stools  Genito-Urinary ROS: positive for - urinary frequency/urgency  Musculoskeletal ROS: negative  Neurological ROS: no TIA or stroke symptoms  Dermatological ROS: negative       Objective  Physical Exam  Vitals and nursing note reviewed  Constitutional:       General: He is not in acute distress  Appearance: Normal appearance  He is well-developed and normal weight  He is not ill-appearing, toxic-appearing or diaphoretic  HENT:      Head: Normocephalic and atraumatic  Pulmonary:      Effort: Pulmonary effort is normal  No respiratory distress  Musculoskeletal:         General: Normal range of motion  Cervical back: Normal range of motion and neck supple  Skin:     General: Skin is warm and dry  Neurological:      General: No focal deficit present  Mental Status: He is alert and oriented to person, place, and time  Mental status is at baseline  Psychiatric:         Mood and Affect: Mood normal          Behavior: Behavior normal          Thought Content:  Thought content normal          Judgment: Judgment normal            Component      Latest Ref Rng & Units 1/25/2018 2/11/2019 3/10/2020 3/10/2021   PSA, Total      0 0 - 4 0 ng/mL 0 4 0 8 0 6 0 5       ULISES Caceres No indicators present

## 2022-08-03 NOTE — PHYSICAL THERAPY INITIAL EVALUATION ADULT - PRECAUTIONS/LIMITATIONS, REHAB EVAL
fall precautions/isolation precautions [Cervical Pap Smear] : cervical Pap smear [Liquid Base] : liquid base [GC & Chlamydia via Pap] : GC & Chlamydia via Pap [Tolerated Well] : the patient tolerated the procedure well [No Complications] : there were no complications

## 2022-11-22 NOTE — PHYSICAL THERAPY INITIAL EVALUATION ADULT - STANDING BALANCE: STATIC
Quality 431: Preventive Care And Screening: Unhealthy Alcohol Use - Screening: Patient not identified as an unhealthy alcohol user when screened for unhealthy alcohol use using a systematic screening method
Quality 130: Documentation Of Current Medications In The Medical Record: Current Medications Documented
Detail Level: Detailed
Quality 226: Preventive Care And Screening: Tobacco Use: Screening And Cessation Intervention: Patient screened for tobacco use and is an ex/non-smoker
fair plus

## 2023-05-15 NOTE — REASON FOR VISIT
Prednisone Counseling:  I discussed with the patient the risks of prolonged use of prednisone including but not limited to weight gain, insomnia, osteoporosis, mood changes, diabetes, susceptibility to infection, glaucoma and high blood pressure.  In cases where prednisone use is prolonged, patients should be monitored with blood pressure checks, serum glucose levels and an eye exam.  Additionally, the patient may need to be placed on GI prophylaxis, PCP prophylaxis, and calcium and vitamin D supplementation and/or a bisphosphonate.  The patient verbalized understanding of the proper use and the possible adverse effects of prednisone.  All of the patient's questions and concerns were addressed. [Spouse] : spouse [Pre-Visit Preparation] : pre-visit preparation was done [Intercurrent Specialty/Sub-specialty Visits] : the patient has intercurrent specialty/sub-specialty visits [Follow-Up] : a follow-up visit [FreeTextEntry3] : SW CM

## 2024-09-27 NOTE — PHYSICAL THERAPY INITIAL EVALUATION ADULT - LEVEL OF INDEPENDENCE: STAND/SIT, REHAB EVAL
At 0448 patient called RN into room complaining of mild SOB and bladder spasms. Patient stated,\" I feel this way at home sometimes and take an albuterol inhaler.\" Vitals were obtained and patients SpO2 remained at 89% on 2 L O2, so a oxymask with 4 L O2 was placed to achieve 93% SpO2. Patient was also c/o of increased bladder spasms with some leaking at the toro insertion site. Patient was last given TID oxybutynin at 2209. Bladder scan was performed with no residual urine in the bladder. Toro is still patent and draining to gravity with hematuria.      Writer paged Dr. Andrew at 0510 and 0521 to notify about patients SOB and spasms. PRN albuterol was ordered and given. Writer reassessed O2 at 0555 and patient dropped down to 88% so O2 was bumped up to 5 L oxymask at 92%. Patient states, \"I feel slightly better.\"     Writer also notified about patient's increased spasms, leaking, and bladder scan results. No new orders were given at this time. RN gave oxybutynin at 0553.    Patient resting in chair comfortably with call light within reach, plan of care ongoing.        supervision
